# Patient Record
Sex: FEMALE | Race: WHITE | NOT HISPANIC OR LATINO | Employment: FULL TIME | URBAN - METROPOLITAN AREA
[De-identification: names, ages, dates, MRNs, and addresses within clinical notes are randomized per-mention and may not be internally consistent; named-entity substitution may affect disease eponyms.]

---

## 2018-03-02 ENCOUNTER — RECORDS - HEALTHEAST (OUTPATIENT)
Dept: LAB | Facility: CLINIC | Age: 28
End: 2018-03-02

## 2018-03-02 LAB
ALT SERPL W P-5'-P-CCNC: 10 U/L (ref 0–45)
ANTIBODY SCREEN: NEGATIVE
AST SERPL W P-5'-P-CCNC: 12 U/L (ref 0–40)
BASOPHILS # BLD AUTO: 0 THOU/UL (ref 0–0.2)
BASOPHILS NFR BLD AUTO: 0 % (ref 0–2)
CREAT SERPL-MCNC: 0.66 MG/DL (ref 0.6–1.1)
CREAT UR-MCNC: 136.8 MG/DL
EOSINOPHIL # BLD AUTO: 0.1 THOU/UL (ref 0–0.4)
EOSINOPHIL NFR BLD AUTO: 1 % (ref 0–6)
ERYTHROCYTE [DISTWIDTH] IN BLOOD BY AUTOMATED COUNT: 12.3 % (ref 11–14.5)
GFR SERPL CREATININE-BSD FRML MDRD: >60 ML/MIN/1.73M2
HCT VFR BLD AUTO: 32.9 % (ref 35–47)
HGB BLD-MCNC: 11.1 G/DL (ref 12–16)
LYMPHOCYTES # BLD AUTO: 1.9 THOU/UL (ref 0.8–4.4)
LYMPHOCYTES NFR BLD AUTO: 16 % (ref 20–40)
MCH RBC QN AUTO: 30.7 PG (ref 27–34)
MCHC RBC AUTO-ENTMCNC: 33.7 G/DL (ref 32–36)
MCV RBC AUTO: 91 FL (ref 80–100)
MONOCYTES # BLD AUTO: 0.7 THOU/UL (ref 0–0.9)
MONOCYTES NFR BLD AUTO: 6 % (ref 2–10)
NEUTROPHILS # BLD AUTO: 9.1 THOU/UL (ref 2–7.7)
NEUTROPHILS NFR BLD AUTO: 77 % (ref 50–70)
PLATELET # BLD AUTO: 229 THOU/UL (ref 140–440)
PMV BLD AUTO: 9.6 FL (ref 8.5–12.5)
PROTEIN, RANDOM URINE - HISTORICAL: 16 MG/DL
PROTEIN/CREAT RATIO, RANDOM UR: 0.12
RBC # BLD AUTO: 3.62 MILL/UL (ref 3.8–5.4)
URATE SERPL-MCNC: 3.6 MG/DL (ref 2–7.5)
WBC: 11.9 THOU/UL (ref 4–11)

## 2018-03-09 ENCOUNTER — RECORDS - HEALTHEAST (OUTPATIENT)
Dept: LAB | Facility: CLINIC | Age: 28
End: 2018-03-09

## 2018-03-09 LAB
ALT SERPL W P-5'-P-CCNC: 11 U/L (ref 0–45)
AST SERPL W P-5'-P-CCNC: 12 U/L (ref 0–40)
BASOPHILS # BLD AUTO: 0 THOU/UL (ref 0–0.2)
BASOPHILS NFR BLD AUTO: 0 % (ref 0–2)
CREAT SERPL-MCNC: 0.64 MG/DL (ref 0.6–1.1)
CREAT UR-MCNC: 99.4 MG/DL
EOSINOPHIL # BLD AUTO: 0.1 THOU/UL (ref 0–0.4)
EOSINOPHIL NFR BLD AUTO: 1 % (ref 0–6)
ERYTHROCYTE [DISTWIDTH] IN BLOOD BY AUTOMATED COUNT: 12.5 % (ref 11–14.5)
GFR SERPL CREATININE-BSD FRML MDRD: >60 ML/MIN/1.73M2
HCT VFR BLD AUTO: 32.8 % (ref 35–47)
HGB BLD-MCNC: 11 G/DL (ref 12–16)
LYMPHOCYTES # BLD AUTO: 2 THOU/UL (ref 0.8–4.4)
LYMPHOCYTES NFR BLD AUTO: 18 % (ref 20–40)
MCH RBC QN AUTO: 30.5 PG (ref 27–34)
MCHC RBC AUTO-ENTMCNC: 33.5 G/DL (ref 32–36)
MCV RBC AUTO: 91 FL (ref 80–100)
MONOCYTES # BLD AUTO: 0.6 THOU/UL (ref 0–0.9)
MONOCYTES NFR BLD AUTO: 5 % (ref 2–10)
NEUTROPHILS # BLD AUTO: 8.4 THOU/UL (ref 2–7.7)
NEUTROPHILS NFR BLD AUTO: 75 % (ref 50–70)
PLATELET # BLD AUTO: 219 THOU/UL (ref 140–440)
PMV BLD AUTO: 9.2 FL (ref 8.5–12.5)
PROTEIN, RANDOM URINE - HISTORICAL: 20 MG/DL
PROTEIN/CREAT RATIO, RANDOM UR: 0.2
RBC # BLD AUTO: 3.61 MILL/UL (ref 3.8–5.4)
URATE SERPL-MCNC: 4.3 MG/DL (ref 2–7.5)
WBC: 11.2 THOU/UL (ref 4–11)

## 2018-03-14 ENCOUNTER — COMMUNICATION - HEALTHEAST (OUTPATIENT)
Dept: ENDOCRINOLOGY | Facility: CLINIC | Age: 28
End: 2018-03-14

## 2018-03-16 ENCOUNTER — RECORDS - HEALTHEAST (OUTPATIENT)
Dept: LAB | Facility: CLINIC | Age: 28
End: 2018-03-16

## 2018-03-16 LAB
ALT SERPL W P-5'-P-CCNC: 11 U/L (ref 0–45)
AST SERPL W P-5'-P-CCNC: 11 U/L (ref 0–40)
BASOPHILS # BLD AUTO: 0 THOU/UL (ref 0–0.2)
BASOPHILS NFR BLD AUTO: 0 % (ref 0–2)
CREAT SERPL-MCNC: 0.6 MG/DL (ref 0.6–1.1)
CREAT UR-MCNC: 82.8 MG/DL
EOSINOPHIL # BLD AUTO: 0.1 THOU/UL (ref 0–0.4)
EOSINOPHIL NFR BLD AUTO: 1 % (ref 0–6)
ERYTHROCYTE [DISTWIDTH] IN BLOOD BY AUTOMATED COUNT: 12.4 % (ref 11–14.5)
GFR SERPL CREATININE-BSD FRML MDRD: >60 ML/MIN/1.73M2
HCT VFR BLD AUTO: 33.2 % (ref 35–47)
HGB BLD-MCNC: 11.1 G/DL (ref 12–16)
LYMPHOCYTES # BLD AUTO: 2.2 THOU/UL (ref 0.8–4.4)
LYMPHOCYTES NFR BLD AUTO: 17 % (ref 20–40)
MCH RBC QN AUTO: 30.6 PG (ref 27–34)
MCHC RBC AUTO-ENTMCNC: 33.4 G/DL (ref 32–36)
MCV RBC AUTO: 92 FL (ref 80–100)
MONOCYTES # BLD AUTO: 0.8 THOU/UL (ref 0–0.9)
MONOCYTES NFR BLD AUTO: 6 % (ref 2–10)
NEUTROPHILS # BLD AUTO: 9.9 THOU/UL (ref 2–7.7)
NEUTROPHILS NFR BLD AUTO: 76 % (ref 50–70)
PLATELET # BLD AUTO: 245 THOU/UL (ref 140–440)
PMV BLD AUTO: 9.3 FL (ref 8.5–12.5)
PROTEIN, RANDOM URINE - HISTORICAL: 13 MG/DL
PROTEIN/CREAT RATIO, RANDOM UR: 0.16
RBC # BLD AUTO: 3.63 MILL/UL (ref 3.8–5.4)
URATE SERPL-MCNC: 3.6 MG/DL (ref 2–7.5)
WBC: 13.1 THOU/UL (ref 4–11)

## 2018-03-21 ENCOUNTER — OFFICE VISIT - HEALTHEAST (OUTPATIENT)
Dept: EDUCATION SERVICES | Facility: CLINIC | Age: 28
End: 2018-03-21

## 2018-03-21 DIAGNOSIS — O24.419 GESTATIONAL DIABETES: ICD-10-CM

## 2018-03-23 ENCOUNTER — COMMUNICATION - HEALTHEAST (OUTPATIENT)
Dept: EDUCATION SERVICES | Facility: CLINIC | Age: 28
End: 2018-03-23

## 2018-03-23 ENCOUNTER — RECORDS - HEALTHEAST (OUTPATIENT)
Dept: LAB | Facility: CLINIC | Age: 28
End: 2018-03-23

## 2018-03-23 LAB
ALT SERPL W P-5'-P-CCNC: 10 U/L (ref 0–45)
AST SERPL W P-5'-P-CCNC: 12 U/L (ref 0–40)
BASOPHILS # BLD AUTO: 0 THOU/UL (ref 0–0.2)
BASOPHILS NFR BLD AUTO: 0 % (ref 0–2)
CREAT SERPL-MCNC: 0.63 MG/DL (ref 0.6–1.1)
CREAT UR-MCNC: 95.2 MG/DL
EOSINOPHIL # BLD AUTO: 0.2 THOU/UL (ref 0–0.4)
EOSINOPHIL NFR BLD AUTO: 1 % (ref 0–6)
ERYTHROCYTE [DISTWIDTH] IN BLOOD BY AUTOMATED COUNT: 12.7 % (ref 11–14.5)
GFR SERPL CREATININE-BSD FRML MDRD: >60 ML/MIN/1.73M2
HCT VFR BLD AUTO: 33.8 % (ref 35–47)
HGB BLD-MCNC: 11.3 G/DL (ref 12–16)
LYMPHOCYTES # BLD AUTO: 2.1 THOU/UL (ref 0.8–4.4)
LYMPHOCYTES NFR BLD AUTO: 19 % (ref 20–40)
MCH RBC QN AUTO: 30.5 PG (ref 27–34)
MCHC RBC AUTO-ENTMCNC: 33.4 G/DL (ref 32–36)
MCV RBC AUTO: 91 FL (ref 80–100)
MONOCYTES # BLD AUTO: 0.8 THOU/UL (ref 0–0.9)
MONOCYTES NFR BLD AUTO: 7 % (ref 2–10)
NEUTROPHILS # BLD AUTO: 8.4 THOU/UL (ref 2–7.7)
NEUTROPHILS NFR BLD AUTO: 73 % (ref 50–70)
PLATELET # BLD AUTO: 231 THOU/UL (ref 140–440)
PMV BLD AUTO: 9.4 FL (ref 8.5–12.5)
PROTEIN, RANDOM URINE - HISTORICAL: 16 MG/DL
PROTEIN/CREAT RATIO, RANDOM UR: 0.17
RBC # BLD AUTO: 3.71 MILL/UL (ref 3.8–5.4)
URATE SERPL-MCNC: 4.1 MG/DL (ref 2–7.5)
WBC: 11.6 THOU/UL (ref 4–11)

## 2018-03-30 ENCOUNTER — RECORDS - HEALTHEAST (OUTPATIENT)
Dept: LAB | Facility: CLINIC | Age: 28
End: 2018-03-30

## 2018-03-30 LAB
ALT SERPL W P-5'-P-CCNC: 10 U/L (ref 0–45)
AST SERPL W P-5'-P-CCNC: 11 U/L (ref 0–40)
BASOPHILS # BLD AUTO: 0 THOU/UL (ref 0–0.2)
BASOPHILS NFR BLD AUTO: 0 % (ref 0–2)
CREAT SERPL-MCNC: 0.58 MG/DL (ref 0.6–1.1)
CREAT UR-MCNC: 72.5 MG/DL
EOSINOPHIL # BLD AUTO: 0.1 THOU/UL (ref 0–0.4)
EOSINOPHIL NFR BLD AUTO: 1 % (ref 0–6)
ERYTHROCYTE [DISTWIDTH] IN BLOOD BY AUTOMATED COUNT: 12.8 % (ref 11–14.5)
GFR SERPL CREATININE-BSD FRML MDRD: >60 ML/MIN/1.73M2
HCT VFR BLD AUTO: 30.8 % (ref 35–47)
HGB BLD-MCNC: 10.4 G/DL (ref 12–16)
LYMPHOCYTES # BLD AUTO: 1.7 THOU/UL (ref 0.8–4.4)
LYMPHOCYTES NFR BLD AUTO: 16 % (ref 20–40)
MCH RBC QN AUTO: 31.1 PG (ref 27–34)
MCHC RBC AUTO-ENTMCNC: 33.8 G/DL (ref 32–36)
MCV RBC AUTO: 92 FL (ref 80–100)
MONOCYTES # BLD AUTO: 0.7 THOU/UL (ref 0–0.9)
MONOCYTES NFR BLD AUTO: 6 % (ref 2–10)
NEUTROPHILS # BLD AUTO: 8.5 THOU/UL (ref 2–7.7)
NEUTROPHILS NFR BLD AUTO: 77 % (ref 50–70)
PLATELET # BLD AUTO: 236 THOU/UL (ref 140–440)
PMV BLD AUTO: 9.6 FL (ref 8.5–12.5)
PROTEIN, RANDOM URINE - HISTORICAL: 13 MG/DL
PROTEIN/CREAT RATIO, RANDOM UR: 0.18
RBC # BLD AUTO: 3.34 MILL/UL (ref 3.8–5.4)
URATE SERPL-MCNC: 4 MG/DL (ref 2–7.5)
WBC: 11 THOU/UL (ref 4–11)

## 2018-04-03 ENCOUNTER — AMBULATORY - HEALTHEAST (OUTPATIENT)
Dept: EDUCATION SERVICES | Facility: CLINIC | Age: 28
End: 2018-04-03

## 2018-04-03 DIAGNOSIS — O24.419 GESTATIONAL DIABETES: ICD-10-CM

## 2018-04-04 ENCOUNTER — OFFICE VISIT - HEALTHEAST (OUTPATIENT)
Dept: FAMILY MEDICINE | Facility: CLINIC | Age: 28
End: 2018-04-04

## 2018-04-04 ENCOUNTER — COMMUNICATION - HEALTHEAST (OUTPATIENT)
Dept: SCHEDULING | Facility: CLINIC | Age: 28
End: 2018-04-04

## 2018-04-04 ENCOUNTER — COMMUNICATION - HEALTHEAST (OUTPATIENT)
Dept: TELEHEALTH | Facility: CLINIC | Age: 28
End: 2018-04-04

## 2018-04-04 DIAGNOSIS — Z34.90 PREGNANT: ICD-10-CM

## 2018-04-04 DIAGNOSIS — R11.2 NAUSEA & VOMITING: ICD-10-CM

## 2018-04-11 ENCOUNTER — COMMUNICATION - HEALTHEAST (OUTPATIENT)
Dept: EDUCATION SERVICES | Facility: CLINIC | Age: 28
End: 2018-04-11

## 2018-04-12 ENCOUNTER — OFFICE VISIT - HEALTHEAST (OUTPATIENT)
Dept: ENDOCRINOLOGY | Facility: CLINIC | Age: 28
End: 2018-04-12

## 2018-04-12 ENCOUNTER — OFFICE VISIT - HEALTHEAST (OUTPATIENT)
Dept: EDUCATION SERVICES | Facility: CLINIC | Age: 28
End: 2018-04-12

## 2018-04-12 DIAGNOSIS — O24.414 INSULIN CONTROLLED GESTATIONAL DIABETES MELLITUS (GDM) IN THIRD TRIMESTER: ICD-10-CM

## 2018-04-12 RX ORDER — SWAB
1 SWAB, NON-MEDICATED MISCELLANEOUS DAILY
Status: SHIPPED | COMMUNITY
Start: 2018-04-12

## 2018-04-17 ENCOUNTER — RECORDS - HEALTHEAST (OUTPATIENT)
Dept: LAB | Facility: CLINIC | Age: 28
End: 2018-04-17

## 2018-04-17 LAB
ALT SERPL W P-5'-P-CCNC: 10 U/L (ref 0–45)
AST SERPL W P-5'-P-CCNC: 12 U/L (ref 0–40)
BASOPHILS # BLD AUTO: 0 THOU/UL (ref 0–0.2)
BASOPHILS NFR BLD AUTO: 0 % (ref 0–2)
CREAT SERPL-MCNC: 0.62 MG/DL (ref 0.6–1.1)
CREAT UR-MCNC: 83.7 MG/DL
EOSINOPHIL # BLD AUTO: 0.1 THOU/UL (ref 0–0.4)
EOSINOPHIL NFR BLD AUTO: 1 % (ref 0–6)
ERYTHROCYTE [DISTWIDTH] IN BLOOD BY AUTOMATED COUNT: 12.8 % (ref 11–14.5)
GFR SERPL CREATININE-BSD FRML MDRD: >60 ML/MIN/1.73M2
HCT VFR BLD AUTO: 31.5 % (ref 35–47)
HGB BLD-MCNC: 10.7 G/DL (ref 12–16)
LYMPHOCYTES # BLD AUTO: 2.3 THOU/UL (ref 0.8–4.4)
LYMPHOCYTES NFR BLD AUTO: 18 % (ref 20–40)
MCH RBC QN AUTO: 30.7 PG (ref 27–34)
MCHC RBC AUTO-ENTMCNC: 34 G/DL (ref 32–36)
MCV RBC AUTO: 91 FL (ref 80–100)
MONOCYTES # BLD AUTO: 0.9 THOU/UL (ref 0–0.9)
MONOCYTES NFR BLD AUTO: 7 % (ref 2–10)
NEUTROPHILS # BLD AUTO: 9.5 THOU/UL (ref 2–7.7)
NEUTROPHILS NFR BLD AUTO: 74 % (ref 50–70)
PLATELET # BLD AUTO: 241 THOU/UL (ref 140–440)
PMV BLD AUTO: 9.4 FL (ref 8.5–12.5)
PROTEIN, RANDOM URINE - HISTORICAL: 17 MG/DL
PROTEIN/CREAT RATIO, RANDOM UR: 0.2
RBC # BLD AUTO: 3.48 MILL/UL (ref 3.8–5.4)
URATE SERPL-MCNC: 3.8 MG/DL (ref 2–7.5)
WBC: 12.9 THOU/UL (ref 4–11)

## 2018-04-24 ENCOUNTER — HOSPITAL ENCOUNTER (OUTPATIENT)
Dept: MEDSURG UNIT | Facility: CLINIC | Age: 28
Discharge: HOME | End: 2018-04-24
Attending: OBSTETRICS & GYNECOLOGY | Admitting: OBSTETRICS & GYNECOLOGY

## 2018-04-24 LAB
ABO/RH(D): NORMAL
ALBUMIN UR-MCNC: NEGATIVE MG/DL
ALT SERPL W P-5'-P-CCNC: 10 U/L (ref 0–45)
ANTIBODY SCREEN: NORMAL
APTT PPP: 27 SECONDS (ref 24–37)
AST SERPL W P-5'-P-CCNC: 10 U/L (ref 0–40)
CREAT SERPL-MCNC: 0.65 MG/DL (ref 0.6–1.1)
CREAT UR-MCNC: 47.8 MG/DL
ERYTHROCYTE [DISTWIDTH] IN BLOOD BY AUTOMATED COUNT: 13 % (ref 11–14.5)
GFR SERPL CREATININE-BSD FRML MDRD: >60 ML/MIN/1.73M2
GLUCOSE UR STRIP-MCNC: NEGATIVE MG/DL
HCT VFR BLD AUTO: 30.4 % (ref 35–47)
HGB BLD-MCNC: 10.2 G/DL (ref 12–16)
INR PPP: 1.04 (ref 0.9–1.1)
KETONES UR STRIP-MCNC: NEGATIVE MG/DL
MCH RBC QN AUTO: 30.4 PG (ref 27–34)
MCHC RBC AUTO-ENTMCNC: 33.6 G/DL (ref 32–36)
MCV RBC AUTO: 91 FL (ref 80–100)
PLATELET # BLD AUTO: 201 THOU/UL (ref 140–440)
PMV BLD AUTO: 9.3 FL (ref 8.5–12.5)
PROTEIN, RANDOM URINE - HISTORICAL: 12 MG/DL
PROTEIN/CREAT RATIO, RANDOM UR: 0.25
RBC # BLD AUTO: 3.35 MILL/UL (ref 3.8–5.4)
URATE SERPL-MCNC: 3.9 MG/DL (ref 2–7.5)
WBC: 10.6 THOU/UL (ref 4–11)

## 2018-04-24 ASSESSMENT — MIFFLIN-ST. JEOR: SCORE: 1686.11

## 2018-04-26 ENCOUNTER — RECORDS - HEALTHEAST (OUTPATIENT)
Dept: LAB | Facility: CLINIC | Age: 28
End: 2018-04-26

## 2018-04-26 ENCOUNTER — OFFICE VISIT - HEALTHEAST (OUTPATIENT)
Dept: EDUCATION SERVICES | Facility: CLINIC | Age: 28
End: 2018-04-26

## 2018-04-26 ENCOUNTER — OFFICE VISIT - HEALTHEAST (OUTPATIENT)
Dept: ENDOCRINOLOGY | Facility: CLINIC | Age: 28
End: 2018-04-26

## 2018-04-26 DIAGNOSIS — O24.414 INSULIN CONTROLLED GESTATIONAL DIABETES MELLITUS (GDM) IN THIRD TRIMESTER: ICD-10-CM

## 2018-04-26 LAB
CREAT UR-MCNC: 63.9 MG/DL
PROTEIN, RANDOM URINE - HISTORICAL: 16 MG/DL
PROTEIN/CREAT RATIO, RANDOM UR: 0.25

## 2018-04-26 ASSESSMENT — MIFFLIN-ST. JEOR: SCORE: 1698.36

## 2018-05-04 ENCOUNTER — RECORDS - HEALTHEAST (OUTPATIENT)
Dept: LAB | Facility: CLINIC | Age: 28
End: 2018-05-04

## 2018-05-04 LAB
CREAT UR-MCNC: 14.9 MG/DL
PROTEIN, RANDOM URINE - HISTORICAL: <7 MG/DL
PROTEIN/CREAT RATIO, RANDOM UR: NORMAL

## 2018-05-10 ENCOUNTER — RECORDS - HEALTHEAST (OUTPATIENT)
Dept: ADMINISTRATIVE | Facility: OTHER | Age: 28
End: 2018-05-10

## 2018-05-16 ENCOUNTER — ANESTHESIA - HEALTHEAST (OUTPATIENT)
Dept: OBGYN | Facility: CLINIC | Age: 28
End: 2018-05-16

## 2018-05-18 ENCOUNTER — HOME CARE/HOSPICE - HEALTHEAST (OUTPATIENT)
Dept: HOME HEALTH SERVICES | Facility: HOME HEALTH | Age: 28
End: 2018-05-18

## 2018-05-19 ENCOUNTER — HOME CARE/HOSPICE - HEALTHEAST (OUTPATIENT)
Dept: HOME HEALTH SERVICES | Facility: HOME HEALTH | Age: 28
End: 2018-05-19

## 2018-05-21 ENCOUNTER — RECORDS - HEALTHEAST (OUTPATIENT)
Dept: LAB | Facility: CLINIC | Age: 28
End: 2018-05-21

## 2018-05-21 LAB
BASOPHILS # BLD AUTO: 0.1 THOU/UL (ref 0–0.2)
BASOPHILS NFR BLD AUTO: 0 % (ref 0–2)
EOSINOPHIL # BLD AUTO: 0.4 THOU/UL (ref 0–0.4)
EOSINOPHIL NFR BLD AUTO: 3 % (ref 0–6)
ERYTHROCYTE [DISTWIDTH] IN BLOOD BY AUTOMATED COUNT: 12.5 % (ref 11–14.5)
HCT VFR BLD AUTO: 26.1 % (ref 35–47)
HGB BLD-MCNC: 8.5 G/DL (ref 12–16)
LYMPHOCYTES # BLD AUTO: 2.4 THOU/UL (ref 0.8–4.4)
LYMPHOCYTES NFR BLD AUTO: 16 % (ref 20–40)
MCH RBC QN AUTO: 29.9 PG (ref 27–34)
MCHC RBC AUTO-ENTMCNC: 32.6 G/DL (ref 32–36)
MCV RBC AUTO: 92 FL (ref 80–100)
MONOCYTES # BLD AUTO: 0.9 THOU/UL (ref 0–0.9)
MONOCYTES NFR BLD AUTO: 6 % (ref 2–10)
NEUTROPHILS # BLD AUTO: 10.9 THOU/UL (ref 2–7.7)
NEUTROPHILS NFR BLD AUTO: 75 % (ref 50–70)
PLATELET # BLD AUTO: 321 THOU/UL (ref 140–440)
PMV BLD AUTO: 9.3 FL (ref 8.5–12.5)
RBC # BLD AUTO: 2.84 MILL/UL (ref 3.8–5.4)
WBC: 14.7 THOU/UL (ref 4–11)

## 2018-05-23 ENCOUNTER — COMMUNICATION - HEALTHEAST (OUTPATIENT)
Dept: TELEHEALTH | Facility: CLINIC | Age: 28
End: 2018-05-23

## 2018-05-23 ENCOUNTER — COMMUNICATION - HEALTHEAST (OUTPATIENT)
Dept: HEALTH INFORMATION MANAGEMENT | Facility: CLINIC | Age: 28
End: 2018-05-23

## 2018-05-29 ENCOUNTER — COMMUNICATION - HEALTHEAST (OUTPATIENT)
Dept: OBGYN | Facility: HOSPITAL | Age: 28
End: 2018-05-29

## 2020-07-26 ENCOUNTER — HOSPITAL ENCOUNTER (OUTPATIENT)
Dept: MEDSURG UNIT | Facility: CLINIC | Age: 30
Discharge: HOME | End: 2020-07-27
Attending: OBSTETRICS & GYNECOLOGY | Admitting: OBSTETRICS & GYNECOLOGY

## 2020-07-26 LAB
ABO/RH(D): NORMAL
COMPONENT (HISTORICAL CONVERSION): NORMAL
FBS KIT EXPIRATION DATE: NORMAL
FBS KIT LOT #: NORMAL
FETAL BLEED SCREEN: NORMAL
LOT NUMBER (HISTORICAL CONVERSION): NORMAL
STATUS (HISTORICAL CONVERSION): NORMAL

## 2020-07-26 ASSESSMENT — MIFFLIN-ST. JEOR: SCORE: 1640.75

## 2020-08-20 ENCOUNTER — RECORDS - HEALTHEAST (OUTPATIENT)
Dept: ADMINISTRATIVE | Facility: OTHER | Age: 30
End: 2020-08-20

## 2020-08-24 ENCOUNTER — COMMUNICATION - HEALTHEAST (OUTPATIENT)
Dept: ADMINISTRATIVE | Facility: CLINIC | Age: 30
End: 2020-08-24

## 2020-08-26 ENCOUNTER — AMBULATORY - HEALTHEAST (OUTPATIENT)
Dept: EDUCATION SERVICES | Facility: CLINIC | Age: 30
End: 2020-08-26

## 2020-08-26 ENCOUNTER — COMMUNICATION - HEALTHEAST (OUTPATIENT)
Dept: EDUCATION SERVICES | Facility: CLINIC | Age: 30
End: 2020-08-26

## 2020-08-26 DIAGNOSIS — O24.419 GDM (GESTATIONAL DIABETES MELLITUS): ICD-10-CM

## 2020-09-08 ENCOUNTER — AMBULATORY - HEALTHEAST (OUTPATIENT)
Dept: EDUCATION SERVICES | Facility: CLINIC | Age: 30
End: 2020-09-08

## 2020-09-08 DIAGNOSIS — O24.414 INSULIN CONTROLLED GESTATIONAL DIABETES MELLITUS (GDM) IN THIRD TRIMESTER: ICD-10-CM

## 2020-09-09 ENCOUNTER — COMMUNICATION - HEALTHEAST (OUTPATIENT)
Dept: ADMINISTRATIVE | Facility: CLINIC | Age: 30
End: 2020-09-09

## 2020-09-09 ENCOUNTER — COMMUNICATION - HEALTHEAST (OUTPATIENT)
Dept: EDUCATION SERVICES | Facility: CLINIC | Age: 30
End: 2020-09-09

## 2020-09-09 ENCOUNTER — AMBULATORY - HEALTHEAST (OUTPATIENT)
Dept: ENDOCRINOLOGY | Facility: CLINIC | Age: 30
End: 2020-09-09

## 2020-09-09 DIAGNOSIS — O24.419 GESTATIONAL DIABETES MELLITUS (GDM), ANTEPARTUM, GESTATIONAL DIABETES METHOD OF CONTROL UNSPECIFIED: ICD-10-CM

## 2020-09-09 DIAGNOSIS — O24.414 INSULIN CONTROLLED GESTATIONAL DIABETES MELLITUS (GDM) IN THIRD TRIMESTER: ICD-10-CM

## 2020-09-10 ENCOUNTER — AMBULATORY - HEALTHEAST (OUTPATIENT)
Dept: EDUCATION SERVICES | Facility: CLINIC | Age: 30
End: 2020-09-10

## 2020-09-10 DIAGNOSIS — O24.419 GESTATIONAL DIABETES MELLITUS (GDM), ANTEPARTUM, GESTATIONAL DIABETES METHOD OF CONTROL UNSPECIFIED: ICD-10-CM

## 2020-09-16 ENCOUNTER — COMMUNICATION - HEALTHEAST (OUTPATIENT)
Dept: ENDOCRINOLOGY | Facility: CLINIC | Age: 30
End: 2020-09-16

## 2020-09-17 ENCOUNTER — OFFICE VISIT - HEALTHEAST (OUTPATIENT)
Dept: ENDOCRINOLOGY | Facility: CLINIC | Age: 30
End: 2020-09-17

## 2020-09-17 DIAGNOSIS — O24.414 INSULIN CONTROLLED GESTATIONAL DIABETES MELLITUS (GDM) IN THIRD TRIMESTER: ICD-10-CM

## 2020-10-07 ENCOUNTER — OFFICE VISIT - HEALTHEAST (OUTPATIENT)
Dept: ENDOCRINOLOGY | Facility: CLINIC | Age: 30
End: 2020-10-07

## 2020-10-07 DIAGNOSIS — O24.414 INSULIN CONTROLLED GESTATIONAL DIABETES MELLITUS (GDM) IN THIRD TRIMESTER: ICD-10-CM

## 2020-10-12 ENCOUNTER — AMBULATORY - HEALTHEAST (OUTPATIENT)
Dept: OBGYN | Facility: CLINIC | Age: 30
End: 2020-10-12

## 2020-10-12 DIAGNOSIS — Z33.1 PREGNANT STATE, INCIDENTAL: ICD-10-CM

## 2020-10-13 ENCOUNTER — AMBULATORY - HEALTHEAST (OUTPATIENT)
Dept: LAB | Facility: CLINIC | Age: 30
End: 2020-10-13

## 2020-10-13 ENCOUNTER — RECORDS - HEALTHEAST (OUTPATIENT)
Dept: ADMINISTRATIVE | Facility: OTHER | Age: 30
End: 2020-10-13

## 2020-10-13 DIAGNOSIS — Z33.1 PREGNANT STATE, INCIDENTAL: ICD-10-CM

## 2020-10-15 ENCOUNTER — COMMUNICATION - HEALTHEAST (OUTPATIENT)
Dept: SCHEDULING | Facility: CLINIC | Age: 30
End: 2020-10-15

## 2020-10-15 ENCOUNTER — HOSPITAL ENCOUNTER (OUTPATIENT)
Dept: MEDSURG UNIT | Facility: CLINIC | Age: 30
Discharge: HOME | End: 2020-10-15
Attending: OBSTETRICS & GYNECOLOGY | Admitting: OBSTETRICS & GYNECOLOGY

## 2020-10-26 ENCOUNTER — AMBULATORY - HEALTHEAST (OUTPATIENT)
Dept: OBGYN | Facility: CLINIC | Age: 30
End: 2020-10-26

## 2020-10-26 DIAGNOSIS — Z33.1 PREGNANT STATE, INCIDENTAL: ICD-10-CM

## 2020-10-27 ENCOUNTER — AMBULATORY - HEALTHEAST (OUTPATIENT)
Dept: LAB | Facility: CLINIC | Age: 30
End: 2020-10-27

## 2020-10-27 DIAGNOSIS — Z33.1 PREGNANT STATE, INCIDENTAL: ICD-10-CM

## 2020-10-28 ENCOUNTER — ANESTHESIA - HEALTHEAST (OUTPATIENT)
Dept: OBGYN | Facility: CLINIC | Age: 30
End: 2020-10-28

## 2020-10-28 ENCOUNTER — AMBULATORY - HEALTHEAST (OUTPATIENT)
Dept: LAB | Facility: CLINIC | Age: 30
End: 2020-10-28

## 2020-10-28 DIAGNOSIS — O24.414 INSULIN CONTROLLED GESTATIONAL DIABETES MELLITUS (GDM) IN THIRD TRIMESTER: ICD-10-CM

## 2020-10-28 LAB
ABO/RH(D): NORMAL
ANTIBODY SCREEN: ABNORMAL
SARS-COV-2 PCR COMMENT: NORMAL
SARS-COV-2 RNA SPEC QL NAA+PROBE: NEGATIVE
SARS-COV-2 VIRUS SPECIMEN SOURCE: NORMAL

## 2020-10-29 ENCOUNTER — COMMUNICATION - HEALTHEAST (OUTPATIENT)
Dept: SCHEDULING | Facility: CLINIC | Age: 30
End: 2020-10-29

## 2020-10-29 ENCOUNTER — HOME CARE/HOSPICE - HEALTHEAST (OUTPATIENT)
Dept: HOME HEALTH SERVICES | Facility: HOME HEALTH | Age: 30
End: 2020-10-29

## 2020-10-29 ENCOUNTER — SURGERY - HEALTHEAST (OUTPATIENT)
Dept: OBGYN | Facility: CLINIC | Age: 30
End: 2020-10-29

## 2020-10-29 ENCOUNTER — HOSPITAL ENCOUNTER (OUTPATIENT)
Dept: OBGYN | Facility: CLINIC | Age: 30
Discharge: HOME | End: 2020-10-29

## 2020-10-29 LAB — ANTIBODY ID: NORMAL

## 2020-10-29 ASSESSMENT — MIFFLIN-ST. JEOR: SCORE: 1686.11

## 2020-11-01 ENCOUNTER — HOME CARE/HOSPICE - HEALTHEAST (OUTPATIENT)
Dept: HOME HEALTH SERVICES | Facility: HOME HEALTH | Age: 30
End: 2020-11-01

## 2021-03-24 ENCOUNTER — COMMUNICATION - HEALTHEAST (OUTPATIENT)
Dept: UROLOGY | Facility: CLINIC | Age: 31
End: 2021-03-24

## 2021-03-24 ENCOUNTER — OFFICE VISIT - HEALTHEAST (OUTPATIENT)
Dept: UROLOGY | Facility: CLINIC | Age: 31
End: 2021-03-24

## 2021-03-24 DIAGNOSIS — N20.0 CALCULUS OF KIDNEY: ICD-10-CM

## 2021-03-24 DIAGNOSIS — N13.2 HYDRONEPHROSIS WITH URINARY OBSTRUCTION DUE TO URETERAL CALCULUS: ICD-10-CM

## 2021-03-24 DIAGNOSIS — N20.1 CALCULUS OF URETER: ICD-10-CM

## 2021-05-26 VITALS
HEART RATE: 70 BPM | TEMPERATURE: 97.8 F | DIASTOLIC BLOOD PRESSURE: 84 MMHG | RESPIRATION RATE: 18 BRPM | SYSTOLIC BLOOD PRESSURE: 120 MMHG

## 2021-06-01 VITALS — WEIGHT: 200.9 LBS

## 2021-06-01 VITALS — WEIGHT: 210.7 LBS | HEIGHT: 67 IN | BODY MASS INDEX: 33.07 KG/M2

## 2021-06-01 VITALS — BODY MASS INDEX: 32.65 KG/M2 | HEIGHT: 67 IN | WEIGHT: 208 LBS

## 2021-06-01 VITALS — WEIGHT: 208 LBS

## 2021-06-01 VITALS — WEIGHT: 207.6 LBS

## 2021-06-01 VITALS — WEIGHT: 209 LBS

## 2021-06-04 VITALS — HEIGHT: 67 IN | BODY MASS INDEX: 31.08 KG/M2 | WEIGHT: 198 LBS

## 2021-06-05 VITALS — HEIGHT: 67 IN | BODY MASS INDEX: 32.65 KG/M2 | WEIGHT: 208 LBS

## 2021-06-10 NOTE — PROGRESS NOTES
Pt arrived to triage via wheelchair with c/o vaginal spotting starting at 1800.  Denies gush of fluid, pain or burning with urination or contractions.  States baby is active. Urine sample obtained and will run if ordered. Unable to trace FHR on monitor, will doptone. Hedley applied.

## 2021-06-10 NOTE — TELEPHONE ENCOUNTER
External - [facility]  Referring Provider: Dr. Roman  DX: Gestational Diabetes     Ref./rec. Were received in DM consult folder on 08.20.2020 @ 12:38

## 2021-06-10 NOTE — PROGRESS NOTES
Discharge Note:  Rhogam given.   No contractions noted or felt by patient.  FHR doptoned-140's.  No further vaginal bleeding noted. Discharge instructions reviewed.  All questions answered. Continue to be seen at regularly scheduled prenatal appointments.  Pt to call with further bleeding.   Pt discharged to home from triage, ambulatory to ED entrance.

## 2021-06-10 NOTE — TELEPHONE ENCOUNTER
Please call pt to schedule a GDM follow - up appointment in 2 weeks.     Thanks!   Rosemary Olsen RDN, YAMILE  Diabetes Care and Education

## 2021-06-10 NOTE — PATIENT INSTRUCTIONS - HE
Goals for Diabetes Care:     1. Eat balanced meals (3 meals + 3 snacks daily)     Breakfast: 30 grams carbohydrate + Protein  Snack: 30 grams carbohydrate + protein  Lunch: 60 grams carbohydrate + protein/vegetables  Snack: 30 grams Carbohydrate + protein  Dinner: 60 grams carbohydrate + protein/vegetables  Bedtime Snack: 30 grams + protein     ----Make sure you include protein source with each meal and at bedtime - this has been shown to help with blood glucose elevations     2. Each Morning Check Ketones (small/mod/high - call Diabetes Care Line)  Your goal is negative or trace ketones. If you have ketones in your urine it means you are not eating enough before you go to bed. Eat a larger bedtime snack and include protein.      3. Aim to get at least 30 minutes of activity each day. Activity really helps improve blood sugars.      4. Blood Glucose Targets:              1. Fasting Less than 95 mg/dL              2. 1 hours after a meal target is less than 140 mg/dL  ----Always remember to bring meter and log book to all appointments.        Follow up with your Diabetic Educator as needed to assess BG targets in 2-3 weeks.  If Blood glucose levels are above normal 3 times or more in one week and you cannot explain them or if you develop small, moderate or high ketones call [facility] at [phone]     Call with any questions.     Thank you,  Rosemary Olsen RDN, LD  Diabetes Care and Education  [phone]

## 2021-06-11 NOTE — TELEPHONE ENCOUNTER
Patient called.   The Pharmacy just informed her that they do not have the insulin in stock. They are waiting for the order to arrive. She is wondering if the prescription can be called in to another [facility], or can she wait until the order arrives. She is not sure how long that will be.    Please try [facility] on [address].     Donya @ [phone]

## 2021-06-11 NOTE — PROGRESS NOTES
Diabetes Educator has received verbal consent for a telephone visit for the patient.  Declined video visit    IGOR GDM Care Plan    Assessment:   F/u GDM visit. Donya has been testing BS 4x/d and daily ketones. All her FBS have been high. She has also had a couple of high prandial BS (which are explainable) States, she has been watching CHO foods and adds protein to all meals. She is back at work now (is a ) and activity/walking after meals is challenging at times. She tries to stay as active as she possibly can. Ketones are negative/trace.  Current wt: 90 kg (pt reported)     Does 3 meals/d typically  Food Recall:  BF: protein shake, apple peanut butter  L: whole wheat PBJ (reduced sugar), veggies  D: brown rice, steak, veggies   HS snack: protein shake with 15 g of protein       SMBG  Tests 4x/d  FBS - 97, 95, 100, 99, 93, 110, 101, 97, 97, 95, 99  Post meal BS range 97 - 131  She did have 2 elevated readings in the past 10 days: 149 (food related), 181 (a large subway sandwich)     Plan:   Per protocol, recommend starting pt on bedtime NPH insulin. Would recommend 0.15 U /kg or 14 U since most of her readings are borderline. She will be now following up at the pregnancy clinic.  Pt was on insulin during her previous pregnancy also and feels comfortable with insulin/injection technique. We reviewed insulin basics and sx and tx of hypoglycemia - pt expressed understanding.   All additional questions and concerns addressed today.    Patient Instructions:  1. Continue to test blood sugar 4 times per day and daily ketones.  2. Keep a BS and food log for review.  3. Continue to drink a lot of water to stay hydrated and dilute ketones.  4. Eat 3 meals + 3 snacks as able; include adequate amounts of CHO and protein  5. Eat a bedtime snack (with protein + CHO)  6. Stay active after meals as able and safe to help manage blood sugars.  7. Call with 3 high BS in any one area/higher than trace ketones  in a 7 -Day period.  8. F/u in 1 week at the pregnancy clinic.  9.Take insulin as prescribed       Provider: Marylou Roman  Provider's Diagnosis (per referral form): Gestational (648.83)    Weight:    OGTT: No results found for this or any previous visit.  EDC: Estimated Date of Delivery: 11/4/20   Pregnancy #: 2  Previous GDM: Yes  Medications:   Current Outpatient Medications:      acetone, urine, test (ACETONE, URINE, TEST) Strp, Test ketones once a day, Disp: 100 strip, Rfl: 2     aspirin 81 mg chewable tablet, Chew 81 mg daily., Disp: , Rfl:      blood glucose test (ACCU-CHEK GUIDE TEST STRIPS) strips, Test 4 times daily, Disp: 150 strip, Rfl: 2     blood-glucose meter Misc, Please dispense Accu Check Guide meter +compatible  testing supplies OR any meter + compatible testing supplies per insurance formualry, Disp: 1 each, Rfl: 0     cholecalciferol, vitamin D3, 1,000 unit tablet, Take 1,000 Units by mouth daily., Disp: , Rfl:      folic acid (FOLVITE) 1 MG tablet, Take 1 mg by mouth daily. Take 3 tabs daily, Disp: , Rfl:      generic lancets, Test 4 times daily, Disp: 150 each, Rfl: 2     prenatal vitamin iron-folic acid 27mg-0.8mg (PRENATAL S) 27 mg iron- 800 mcg Tab tablet, Take 1 tablet by mouth daily., Disp: , Rfl:   PNV: yes  BG monitoring goals: Fasting <95; 1 hour post start of meal <140. Test 4 x per day.  Check fasting a.m. ketones: Yes  GDM meal pattern/carb counting taught per guidelines: Yes  Goals: Nutrition: GDM meal plan  Activity:  Walking after meals when able/staying active  Monitoring:  BG 4x/day as directed, ketones every morning    F/u in 1 week to assess BG and food log     DIABETES CARE PLAN AND EDUCATION RECORD    Gestational Diabetes Disease Process/Preconception Care/Management During Pregnancy/Postpartum:    Meter (per above goals): Discussed, Literature provided and Patient returned demonstration    Nutrition Management    Weight: Assessed, Discussed and Literature  provided  Portions/Balance: Assessed, Discussed and Literature provided  Carb ID/Count: Assessed, Discussed and Literature provided  Label Reading: Assessed, Discussed and Literature provided  Menu Planning: Assessed, Discussed and Literature provided  Dining Out: Assessed, Discussed and Literature provided  Physical Activity: Discussed and Literature provided    Acute Complications: Prevent, Detect, Treat:    Goal Setting and Problem Solving: Assessed and Discussed  Barriers: Assessed and Discussed  Psychosocial Adjustments: Assessed and Discussed      Time: 45 minutes  Visit Type: GDM   Visit #: 2    I agree with the aforementioned diabetes plan.  Vidya ROLON [facility]  9/9/2020  4:25 PM

## 2021-06-11 NOTE — PATIENT INSTRUCTIONS - HE
1. Continue to test blood sugar 4 times per day and daily ketones.  2. Keep a BS and food log for review.  3. Continue to drink a lot of water to stay hydrated and dilute ketones.  4. Eat 3 meals + 3 snacks as able; include adequate amounts of CHO and protein  5. Eat a bedtime snack (with protein + CHO)  6. Stay active after meals as able and safe to help manage blood sugars.  7. Call with 3 high BS in any one area/higher than trace ketones in a 7 -Day period.  8. F/u in 1 week at the pregnancy clinic.  9. Take insulin as prescribed

## 2021-06-11 NOTE — TELEPHONE ENCOUNTER
Patient was informed a night time insulin rx would be sent to the pharmacy this morning, but the pharmacy does not have the prescription. Please send rx       Juan on file    Donya @ [phone]

## 2021-06-11 NOTE — PROGRESS NOTES
"Donya Bliss is a 30 y.o. female who is being evaluated via a billable video visit.      The patient has been notified of following:     \"This video visit will be conducted via a call between you and your physician/provider. We have found that certain health care needs can be provided without the need for an in-person physical exam.  This service lets us provide the care you need with a video conversation.  If a prescription is necessary we can send it directly to your pharmacy.  If lab work is needed we can place an order for that and you can then stop by our lab to have the test done at a later time.    Video visits are billed at different rates depending on your insurance coverage. Please reach out to your insurance provider with any questions.    If during the course of the call the physician/provider feels a video visit is not appropriate, you will not be charged for this service.\"    Patient has given verbal consent to a Video visit? Yes  How would you like to obtain your AVS? AVS Preference: Sellbox[employer].  If dropped by the video visit, the video invitation should be sent to: Other e-mail: My Chart  Will anyone else be joining your video visit? No        Video Start Time: 1418    Additional provider notes:      Reason for visit      1. Insulin controlled gestational diabetes mellitus (GDM) in third trimester        HPI     Donya Bliss is a very pleasant 30 y.o. old female who presents for GESTATIONAL Diabetes Mellitus.  She is currently 33w1d  pregnant . Due date is 2020   Diagnosed with GDM based on an OGTT. She hashad  GDM in prior pregnancies.   Current carbohydrate intake:consistent with recommendations of 30g-60g-60g.  I have reviewed her blood glucose logs and note that the:  Fasting readings  are:in range on current regimen  Postprandial readings are:in range on current regimen  Current Levemir dose:8 u  Current Prandial insulin:   Blood glucose logs/meter brought in and data reviewed and " incorporated into decision-making.  Planned delivery at: [facility]  OBGYN: [facility]    Therapy/Interventions in the past:  She has been seen by the Diabetes Educator- and has received instruction on carbohydrate counting and  consistency.  Records from referring provider and other sources have also been reviewed and incorporated into decision-making.      TODAY:    Donya is contacted today via Video visit for GDM. We are joined by KHUSHBU Rodríguez.  She has had it in a previous pregnancy and baby weighed 8 lb, 9 oz. She provides BG for us today and they all look good - she started insulin last Friday night, and her FBS improved immediately. No questions today. She reports that baby is moving appropriately and she is having no swelling at present.     Blood Sugar Readings    9-10 Thursday  F 106  B 111  L 140  D 129    9-11 Friday  F 106  B <140  L <140  D 141    9-12 Saturday  F 89  B 110  L 129  D <140    9-13 Sunday   F 95  B <140  L <140  D <140    9-14 Monday  F 94  B <140  L <140  D <140    9-15 Tuesday  F 87  B <140  L <140  D <140    9-16 Wednesday   F 88  B <140  L <140  D 141    9-17 Thursday  F 87  B 131  L <140      Past Medical History       Patient Active Problem List   Diagnosis     Insulin controlled gestational diabetes mellitus (GDM) in third trimester     Gestational hypertension     Pregnant     Delivery normal        Past Surgical History     Past Surgical History:   Procedure Laterality Date     REDUCTION MAMMAPLASTY  2009     WISDOM TOOTH EXTRACTION Bilateral 2008       Family History     No family history on file.    Social History     Social History     Tobacco Use     Smoking status: Never Smoker     Smokeless tobacco: Never Used   Substance Use Topics     Alcohol use: No     Drug use: No       Review of Systems     Patient has no polyuria or polydipsia, no chest pain, dyspnea or TIA's, no numbness, tingling or pain in extremities  Remainder negative except as noted in HPI.      Vital Signs      There were no vitals taken for this visit.  Wt Readings from Last 3 Encounters:   20 198 lb (89.8 kg)   18 210 lb (95.3 kg)   05/10/18 210 lb (95.3 kg)           Assessment     1. Insulin controlled gestational diabetes mellitus (GDM) in third trimester        Plan     1. GESTATIONAL DIABETES-  Adjust dose as follows:    -Levemir insulin8   units. Increase by 2 units every 2 days to keep fasting blood glucose below 95mg/dL  -Novolog 0  units with breakfast  -Novolog 0 units with lunch   -Novolog 0 units with dinner  -Increase by 0 units every 2 days to keep 1 hour after meal blood glucose less than 140mg/dL    We reviewed glucose goals of fasting blood glucose <95 mg/dL and 1 hour post prandial blood glucose of <140 mg/dL.    Monitor blood sugar 4 times daily: Fasting  and 1 hour after each meal.  Contact  this clinic [phone] if blood glucose is not within the above-mentioned goals.     We discussed the importance of excellent glycemic control during pregnancy to limit complications such as fetal macrosomia, shoulder dystocia,  hypoglycemia and hyperbilirubinemia.  I have discussed the patient's increased risk of recurrent GDM and/or development of type 2 diabetes later in life.      F/u in 2 weeks            Lab Results     Creatinine   Date Value Ref Range Status   05/10/2018 0.65 0.60 - 1.10 mg/dL Final   2018 0.65 0.60 - 1.10 mg/dL Final   2018 0.62 0.60 - 1.10 mg/dL Final       No results found for: CHOL, HDL, LDLCALC, TRIG    Lab Results   Component Value Date    ALT <9 05/10/2018    AST 12 05/10/2018         Current Medications     Outpatient Medications Prior to Visit   Medication Sig Dispense Refill     acetone, urine, test (ACETONE, URINE, TEST) Strp Test ketones once a day 100 strip 2     aspirin 81 mg chewable tablet Chew 81 mg daily.       blood glucose test (ACCU-CHEK GUIDE TEST STRIPS) strips Test 4 times daily 150 strip 2     blood-glucose meter Misc Please  "dispense Accu Check Guide meter +compatible  testing supplies OR any meter + compatible testing supplies per insurance formualry 1 each 0     cholecalciferol, vitamin D3, 1,000 unit tablet Take 1,000 Units by mouth daily.       ferrous sulfate (IRON ORAL) Take by mouth.       folic acid (FOLVITE) 1 MG tablet Take 1 mg by mouth daily. Take 3 tabs daily       generic lancets Test 4 times daily 150 each 2     HUMULIN N NPH INSULIN KWIKPEN 100 unit/mL (3 mL) pen Start taking 8 units every night.  Increase as directed.  Max daily dose 30 units. 6 mL 2     insulin detemir U-100 (LEVEMIR FLEXTOUCH U-100 INSULN) 100 unit/mL (3 mL) pen Start taking 8 units every night at bedtime.  Increase as directed.  Max daily dose 30 units. 6 mL 2     pen needle, diabetic (BD ULTRA-FINE HOMER PEN NEEDLE) 32 gauge x 5/32\" Ndle Use daily with insulin pen 100 each 5     prenatal vitamin iron-folic acid 27mg-0.8mg (PRENATAL S) 27 mg iron- 800 mcg Tab tablet Take 1 tablet by mouth daily.       No facility-administered medications prior to visit.            Video-Visit Details    Type of service:  Video Visit    Video End Time (time video stopped): 1439  Originating Location (pt. Location): Home    Distant Location (provider location):  [facility] ENDOCRINOLOGY     Platform used for Video Visit: Willy SALDAÑA      " Never

## 2021-06-11 NOTE — TELEPHONE ENCOUNTER
Central PA team  [phone]  Pool: HE PA MED ([id number])          PA has been initiated.       PA form completed and faxed insurance via Cover My Meds     Key:  [id number]     Medication:  LEVEMIR FLEXTOUCH    Insurance:  HEALTH [employer]        Response will be received via fax and may take up to 5-10 business days depending on plan

## 2021-06-11 NOTE — TELEPHONE ENCOUNTER
Patient was started on Insulin today. Please call patient to schedule a f/u appointment at the pregnancy clinic.    Thanks!   Rosemary Olsen RDN, LD  Diabetes Care and Education

## 2021-06-11 NOTE — TELEPHONE ENCOUNTER
Pt calling back. [facility] needs dr's signature for insulin. Please fix so that pt will not have to pay full price for it.

## 2021-06-11 NOTE — TELEPHONE ENCOUNTER
Date: 9/17/2020 Status: Frank[place]   Time: 2:20 PM Length: 20   Visit Type: VIDEO VISIT RETURN [1702] Copay: $0.00   Provider: Vidya Perez NP Department: [facility] ENDOCRIN[facility]Y   Referring Provider: GEORGINA SHORE CSN: [id number]   Notes: video, send link to email, [facility] clinic

## 2021-06-11 NOTE — TELEPHONE ENCOUNTER
insulin detemir U-100 (LEVEMIR FLEXTOUCH U-100 INSULN) 100 unit/mL (3 mL) pen  6 mL  2  9/9/2020   --    Sig: Start taking 8 units every night at bedtime.  Increase as directed.  Max daily dose 30 units.      Please initiate PA for above.

## 2021-06-12 NOTE — ANESTHESIA PROCEDURE NOTES
Spinal Block    Patient location during procedure: OR  Start time: 10/29/2020 7:43 AM  End time: 10/29/2020 7:47 AM  Reason for block: primary anesthetic    Staffing:  Performing  Anesthesiologist: Yobani Cha MD    Preanesthetic Checklist  Completed: patient identified, risks, benefits, and alternatives discussed, timeout performed, consent obtained, airway assessed, oxygen available, suction available, emergency drugs available and hand hygiene performed  Spinal Block  Patient position: sitting  Prep: ChloraPrep  Patient monitoring: heart rate, cardiac monitor, continuous pulse ox and blood pressure  Approach: midline  Location: L3-4  Injection technique: single-shot  Needle type: pencil-tip   Needle gauge: 25 G      Additional Notes:  One needle pass, good CSF flow in all four quadrants, no heme appreciated

## 2021-06-12 NOTE — ANESTHESIA CARE TRANSFER NOTE
Last vitals:   Vitals:    10/29/20 0856   BP: 130/59   Pulse: 87   Resp:    Temp:    SpO2: 97%     Patient's level of consciousness is awake  Spontaneous respirations: yes  Maintains airway independently: yes  Dentition unchanged: yes  Oropharynx: oropharynx clear of all foreign objects    QCDR Measures:  ASA# 20 - Surgical Safety Checklist: WHO surgical safety checklist completed prior to induction    PQRS# 430 - Adult PONV Prevention: 4558F - Pt received => 2 anti-emetic agents (different classes) preop & intraop  ASA# 8 - Peds PONV Prevention: NA - Not pediatric patient, not GA or 2 or more risk factors NOT present  PQRS# 424 - Rochelle-op Temp Management: 4559F-8P - Body temp not recorded within timeframe  PQRS# 426 - PACU Transfer Protocol: - Transfer of care checklist used  ASA# 14 - Acute Post-op Pain: ASA14B - Patient did NOT experience pain >= 7 out of 10

## 2021-06-12 NOTE — ANESTHESIA PROCEDURE NOTES
Peripheral Block    Patient location during procedure: OR  Start time: 10/29/2020 8:40 AM  End time: 10/29/2020 8:50 AM  post-op analgesia per surgeon order as noted in medical record  Staffing:  Performing  Anesthesiologist: Yobani Cha MD  Preanesthetic Checklist  Completed: patient identified, site marked, risks, benefits, and alternatives discussed, timeout performed, consent obtained, at patient's request, airway assessed, oxygen available, suction available, emergency drugs available and hand hygiene performed  Peripheral Block  Block type: other, TAP  Prep: ChloraPrep  Patient position: supine  Patient monitoring: cardiac monitor, continuous pulse oximetry, blood pressure and heart rate  Laterality: bilateral, same technique used bilaterally  Injection technique: ultrasound guided    Ultrasound used to visualize needle placement in proximity to nerve being blocked: yes   US used to visualize anesthetic spread  Visualized anatomic structures normal  No Pathological Findings  Permanent ultrasound image captured for medical record  Sterile gel and probe cover used for ultrasound.  Needle  Needle type: Stimuplex   Needle gauge: 21 G  Needle length: 4 in  no peripheral nerve catheter placed  Assessment  Injection assessment: negative aspiration for heme, no difficulty with injection, no paresthesia on injection and incremental injection

## 2021-06-12 NOTE — ANESTHESIA POSTPROCEDURE EVALUATION
Patient: Donya Bliss  Procedure(s):  PRIMARY  SECTION  Anesthesia type: spinal    Patient location: Labor and Delivery  Last vitals:   Vitals Value Taken Time   /70 10/29/20 1724   Temp 36.9  C (98.5  F) 10/29/20 1730   Pulse 78 10/29/20 1818   Resp 16 10/29/20 1800   SpO2 98 % 10/29/20 181   Vitals shown include unvalidated device data.  Post vital signs: stable  Level of consciousness: awake and responds to simple questions  Post-anesthesia pain: pain controlled  Post-anesthesia nausea and vomiting: no  Pulmonary: unassisted, return to baseline  Cardiovascular: stable and blood pressure at baseline  Hydration: adequate  Anesthetic events: no    QCDR Measures:  ASA# 11 - Rochelle-op Cardiac Arrest: ASA11B - Patient did NOT experience unanticipated cardiac arrest  ASA# 12 - [place]le-op Mortality Rate: ASA12B - Patient did NOT die  ASA# 13 - PACU Re-Intubation Rate: NA - No ETT / LMA used for case  ASA# 10 - Composite Anes Safety: ASA10A - No serious adverse event    Additional Notes:

## 2021-06-12 NOTE — PROGRESS NOTES
"Donya Bliss is a 30 y.o. female who is being evaluated via a billable video visit.      The patient has been notified of following:     \"This video visit will be conducted via a call between you and your physician/provider. We have found that certain health care needs can be provided without the need for an in-person physical exam.  This service lets us provide the care you need with a video conversation.  If a prescription is necessary we can send it directly to your pharmacy.  If lab work is needed we can place an order for that and you can then stop by our lab to have the test done at a later time.    Video visits are billed at different rates depending on your insurance coverage. Please reach out to your insurance provider with any questions.    If during the course of the call the physician/provider feels a video visit is not appropriate, you will not be charged for this service.\"    Patient has given verbal consent to a Video visit? Yes  How would you like to obtain your AVS? AVS Preference: So[employer]Lendhart.  If dropped by the video visit, the video invitation should be sent to: Text to cell phone: [phone]  Will anyone else be joining your video visit? No        Video Start Time: 1332    Additional provider notes:     [facility]  ENDOCRINOLOGY    Gestational Diabetes 10/7/2020    Donya Bliss, 1990, [id number]          Reason for visit      1. Insulin controlled gestational diabetes mellitus (GDM) in third trimester        HPI     Donya Bliss is a very pleasant 30 y.o. old female who presents for GESTATIONAL Diabetes Mellitus.  She is currently 36w1d  pregnant . Due date is 2020   Diagnosed with GDM based on an OGTT. She hashad  GDM in prior pregnancies.   Current carbohydrate intake:consistent with recommendations of 30g-60g-60g.  I have reviewed her blood glucose logs and note that the:  Fasting readings  are:in range on current regimen  Postprandial readings are:in range on current " regimen  Current Levemir dose:8 u  Current Prandial insulin:   Blood glucose logs/meter brought in and data reviewed and incorporated into decision-making.  Planned delivery at: [facility]  OBGYN: [facility]  Therapy/Interventions in the past:  She has been seen by the Diabetes Educator- and has received instruction on carbohydrate counting and  consistency.  Records from referring provider and other sources have also been reviewed and incorporated into decision-making.      TODAY:    Donya is contacted today in follow up for GDM. She has provided BG for us today and they look good. No changes warranted today. She reports no concerns from her OB at present and she will get a growth scan tomorrow. Baby is passing BPPs and NSTs. Discussed postpartum instructions today and all questions answered to her satisfaction. She will call between now and Delivery with any further questions or concerns.       Blood Sugar Readings    9-30  F 90  B 103  L 127  D 134    10-1  F 95  B 92 (2 hours)  L --  D --    10-2  F 95  B 152  L 84  D 138    10-3  F 86  B 99  L 142  D 105 (2 hours)    10-4  F 86  B 130  L 140  D 129    10-5  F 88  B 77 (2 hours)  L 106 (2 hours)  D 119    10-6  F 87  B 99  L 146  D 111    10-7  F 90  B 107        Past Medical History       Patient Active Problem List   Diagnosis     Insulin controlled gestational diabetes mellitus (GDM) in third trimester     Gestational hypertension     Pregnant     Delivery normal        Past Surgical History     Past Surgical History:   Procedure Laterality Date     REDUCTION MAMMAPLASTY  2009     WISDOM TOOTH EXTRACTION Bilateral 2008       Family History     No family history on file.    Social History     Social History     Tobacco Use     Smoking status: Never Smoker     Smokeless tobacco: Never Used   Substance Use Topics     Alcohol use: No     Drug use: No       Review of Systems     Patient has no polyuria or polydipsia, no chest pain, dyspnea or TIA's, no numbness,  tingling or pain in extremities  Remainder negative except as noted in HPI.      Vital Signs     There were no vitals taken for this visit.  Wt Readings from Last 3 Encounters:   20 198 lb (89.8 kg)   18 210 lb (95.3 kg)   05/10/18 210 lb (95.3 kg)           Assessment     1. Insulin controlled gestational diabetes mellitus (GDM) in third trimester        Plan       1. GESTATIONAL DIABETES-  Adjust dose as follows:     -Levemir insulin8   units. Increase by 2 units every 2 days to keep fasting blood glucose below 95mg/dL  -Novolog 0  units with breakfast  -Novolog 0 units with lunch   -Novolog 0 units with dinner  -Increase by 0 units every 2 days to keep 1 hour after meal blood glucose less than 140mg/dL     We reviewed glucose goals of fasting blood glucose <95 mg/dL and 1 hour post prandial blood glucose of <140 mg/dL.    Monitor blood sugar 4 times daily: Fasting  and 1 hour after each meal.  Contact  this clinic [phone] if blood glucose is not within the above-mentioned goals.      We discussed the importance of excellent glycemic control during pregnancy to limit complications such as fetal macrosomia, shoulder dystocia,  hypoglycemia and hyperbilirubinemia.  I have discussed the patient's increased risk of recurrent GDM and/or development of type 2 diabetes later in life.         F/up with A1c 3 months postpartum with PCP.    May be a candidate for metformin postpartum as she has more than 1 risk factor for future Type 2 Diabetes Mellitus.    She will need a screening A1c at least annually, TLC- including carbohydrate control and exercise.    After you deliver the baby, it is suggested to check your blood sugar occasionally (1 - 2 times per week) for 6 weeks.     When you are not pregnant, normal blood sugars are:       Fasting (before eating anything in the morning)  - under 100 mg/dl    2 hours after meals under 140  The American Diabetes Association recommends:     a glucose  tolerance test 6 weeks after you deliver the baby.         a hemoglobin Alc test yearly after delivery.  The Alc test is a 2-3 month average blood sugar reading.        Discuss getting these tests with your provider.       After delivery, and your provider has said that it is safe to be active, work toward getting 150 minutes each week of physical activity to decrease your risk of  getting type 2 diabetes.       Maintaining a healthy body weight will also decrease your risk of getting type 2 diabetes.             Lab Results     Creatinine   Date Value Ref Range Status   05/10/2018 0.65 0.60 - 1.10 mg/dL Final   04/24/2018 0.65 0.60 - 1.10 mg/dL Final   04/17/2018 0.62 0.60 - 1.10 mg/dL Final       No results found for: CHOL, HDL, LDLCALC, TRIG    Lab Results   Component Value Date    ALT <9 05/10/2018    AST 12 05/10/2018         Current Medications     Outpatient Medications Prior to Visit   Medication Sig Dispense Refill     acetone, urine, test (ACETONE, URINE, TEST) Strp Test ketones once a day 100 strip 2     blood glucose test (ACCU-CHEK GUIDE TEST STRIPS) strips Test 4 times daily 150 strip 2     blood-glucose meter Misc Please dispense Accu Check Guide meter +compatible  testing supplies OR any meter + compatible testing supplies per insurance formualry 1 each 0     cholecalciferol, vitamin D3, 1,000 unit tablet Take 1,000 Units by mouth daily.       ferrous sulfate (IRON ORAL) Take by mouth.       folic acid (FOLVITE) 1 MG tablet Take 1 mg by mouth daily. Take 3 tabs daily       generic lancets Test 4 times daily 150 each 2     HUMULIN N NPH INSULIN KWIKPEN 100 unit/mL (3 mL) pen Start taking 8 units every night.  Increase as directed.  Max daily dose 30 units. 6 mL 2     insulin detemir U-100 (LEVEMIR FLEXTOUCH U-100 INSULN) 100 unit/mL (3 mL) pen Start taking 8 units every night at bedtime.  Increase as directed.  Max daily dose 30 units. 6 mL 2     pen needle, diabetic (BD ULTRA-FINE HOMER PEN NEEDLE) 32  "gauge x 5/32\" Ndle Use daily with insulin pen 100 each 5     prenatal vitamin iron-folic acid 27mg-0.8mg (PRENATAL S) 27 mg iron- 800 mcg Tab tablet Take 1 tablet by mouth daily.       aspirin 81 mg chewable tablet Chew 81 mg daily.       No facility-administered medications prior to visit.          Video-Visit Details    Type of service:  Video Visit    Video End Time (time video stopped): 1352  Originating Location (pt. Location): Home    Distant Location (provider location):  [facility]     Platform used for Video Visit: Aleksandra PORTER-ROCIO      "

## 2021-06-12 NOTE — ANESTHESIA PREPROCEDURE EVALUATION
Anesthesia Evaluation      Patient summary reviewed   No history of anesthetic complications     Airway   Mallampati: II  Neck ROM: full   Pulmonary - negative ROS and normal exam   (-) not a smoker                         Cardiovascular - normal exam  (+) hypertension, ,        ROS comment: Gestational HTN; Hgb 11.     Neuro/Psych      Comments: Migraines.    Endo/Other    (+) diabetes mellitus, obesity, pregnant     Comments: GDM.    GI/Hepatic/Renal - negative ROS           Dental - normal exam                        Anesthesia Plan  Planned anesthetic: spinal and peripheral nerve block  Decadron, Zofran.  PE infusion.  Duramorph.  TAP blocks for POP per surgeon request.  Toradol, Tylenol.  ASA 2     Anesthetic plan and risks discussed with: patient  Anesthesia plan special considerations: antiemetics,   Post-op plan: routine recovery

## 2021-06-16 PROBLEM — O24.414 INSULIN CONTROLLED GESTATIONAL DIABETES MELLITUS (GDM) IN THIRD TRIMESTER: Status: ACTIVE | Noted: 2018-04-14

## 2021-06-16 PROBLEM — O32.9XX0 MALPRESENTATION BEFORE ONSET OF LABOR: Status: ACTIVE | Noted: 2020-10-28

## 2021-06-16 PROBLEM — Z87.59 HISTORY OF SHOULDER DYSTOCIA IN PRIOR PREGNANCY: Status: ACTIVE | Noted: 2020-10-28

## 2021-06-16 NOTE — PROGRESS NOTES
Assessment/Plan:    Assessment & Plan   Donya was seen today for new problem - ed referred.    Diagnoses and all orders for this visit:    Calculus of ureter    Hydronephrosis with urinary obstruction due to ureteral calculus    Calculus of kidney  -     Patient Stated Goal: Prevent further stones  -     24 Hour Urine Collection Steps Education        Stone Management Plan  [facility] 3/24/2021   Urinary Tract Infection No suspicion of infection   Renal Colic Asymptomatic at this time   Renal Failure No suspicion of renal failure   Current CT date 3/23/2021   Right sided stones? Yes   R Number of ureteral stones 1   R GSD of ureteral stones 4   R Location of ureteral stone Distal   R Number of kidney stones  No renal stones   R GSD of kidney stones 2 - 4   R Hydronephrosis Mild   R Stone Event New stone passed prior to visit   R Current Plan Observe   Observe rationale Limited stone burden with good prognosis for spontaneous passage   Left sided stones? Yes   L Number of ureteral stones No ureteral stones   L Number of kidney stones  > 10   L GSD of kidney stones 2 - 4   L Hydronephrosis None   L Stone Event No current event   L Current Plan Observe   Observe rationale Limited stone burden with good prognosis for spontaneous passage         PLAN    31 yo F first time stone former with recently diagnosed obstructing right UVJ stone, asymptomatic. Several, small, bilateral renal stones.    Will initiate comprehensive stone risk evaluation. Two 24 hour urine collections and dietary journal will be obtained at earliest covenience. Patient verbalized understanding. Patient agrees with plan as discussed. She will return to clinic when labs are available.      Recommend she continue to strain urine through the remainder of the week. She is aware to call our office if stone is captured or symptoms re-emerge. Over the counter symptom control medications of ibuprofen, Dramamine and Tylenol were recommended.    Phone  call duration: 8 minutes  14 minutes spent on the date of the encounter doing chart review, history and exam, documentation and further activities as noted above    Susan Mims PA-C  [facility]    Subjective:      HPI  Ms. Donya Bliss is a 30 y.o.  female who is being evaluated via a billable telephone visit by [facility] following [facility] ER visit for urolithiasis.    She is a first time unidentified composition stone former. She has no identified modifiable stone risk factors. She has identified non-modifiable stone risks including:  bilateral stones.    She was seen in ER yesterday for acute onset right lower abdominal pain occurring a few hours PTA. The pain was severe, pressure-like and radiated into the right flank and bladder regions. She had associated chills, nausea, vomiting, diarrhea, and urinary frequency. Workup was notable for CT reporting an obstructing right ureteral stone and bilateral renal stones. Labs were negative for renal injury or acute infection concern. She was sent home with zofran.    She has been asymptomatic since ER discharge. She has not seen a stone pass. She denies current symptoms of fever, chills, flank pain, nausea, vomiting, urinary frequency and dysuria.     CT scan from 3/23/21 is personally reviewed and demonstrates a mildly obstructing < 4 mm right UVJ stone. Several bilateral renal stones, ranging 1- 3 mm.    Significant labs from presentation include mild hematuria, mild pyuria, negative nitrite, few bacteria, no growth on urine culture, elevated WBC, slightly elevated C reactive protein, normal creatinine and normal potassium.     ROS   A 12 point comprehensive review of systems is negative except for HPI    Past Medical History:   Diagnosis Date     Gestational diabetes      Hypertension     gestational HTN     Migraines        Past Surgical History:   Procedure Laterality Date       "SECTION N/A 10/29/2020    Procedure: PRIMARY  SECTION;  Surgeon: Marylou Roman MD;  Location: [facility] L+D OR;  Service: Obstetrics     REDUCTION MAMMAPLASTY  2009     WISDOM TOOTH EXTRACTION Bilateral 2008       Current Outpatient Medications   Medication Sig Dispense Refill     AUROVELA FE 1.5/30, 28, 1.5 mg-30 mcg (21)/75 mg (7) per tablet Take 1 tablet by mouth daily.       blood-glucose meter Misc Please dispense Accu Check Guide meter +compatible  testing supplies OR any meter + compatible testing supplies per insurance formualry 1 each 0     dimenhyDRINATE (DRAMAMINE) 50 MG tablet Take 50mg at bedtime every night until stone passes, in addition, take every 6 hours as needed for nausea or pain. 15 tablet 0     docusate sodium (COLACE) 100 MG capsule Take 1 capsule (100 mg total) by mouth 2 (two) times a day as needed for constipation. 60 capsule 0     generic lancets Test 4 times daily 150 each 2     ibuprofen (ADVIL,MOTRIN) 600 MG tablet Take 1 tablet (600 mg total) by mouth every 6 (six) hours as needed for pain. 40 tablet 0     pen needle, diabetic (BD ULTRA-FINE HOMER PEN NEEDLE) 32 gauge x 32\" Ndle Use daily with insulin pen 100 each 5     prenatal vitamin iron-folic acid 27mg-0.8mg (PRENATAL S) 27 mg iron- 800 mcg Tab tablet Take 1 tablet by mouth daily.       cholecalciferol, vitamin D3, 1,000 unit tablet Take 1,000 Units by mouth daily.       ferrous sulfate (IRON ORAL) Take by mouth.       ondansetron (ZOFRAN ODT) 4 MG disintegrating tablet Take 1 tablet (4 mg total) by mouth every 8 (eight) hours as needed. 4mg 10 tablet 0     oxyCODONE (ROXICODONE) 5 MG immediate release tablet Take 1 tablet (5 mg total) by mouth every 6 (six) hours as needed for pain. 12 tablet 0     No current facility-administered medications for this visit.        No Known Allergies    Social History     Socioeconomic History     Marital status: Single     Spouse name: Not on file     Number of children: " Not on file     Years of education: Not on file     Highest education level: Not on file   Occupational History     Not on file   Social Needs     Financial resource strain: Not on file     Food insecurity     Worry: Not on file     Inability: Not on file     Transportation needs     Medical: Not on file     Non-medical: Not on file   Tobacco Use     Smoking status: Never Smoker     Smokeless tobacco: Never Used   Substance and Sexual Activity     Alcohol use: No     Drug use: No     Sexual activity: Yes     Partners: Male     Birth control/protection: None     Comment: Denies intercourse in last 24 hours. 07/26/20   Lifestyle     Physical activity     Days per week: Not on file     Minutes per session: Not on file     Stress: Not on file   Relationships     Social connections     Talks on phone: Not on file     Gets together: Not on file     Attends Taoism service: Not on file     Active member of club or organization: Not on file     Attends meetings of clubs or organizations: Not on file     Relationship status: Not on file     Intimate partner violence     Fear of current or ex partner: Not on file     Emotionally abused: Not on file     Physically abused: Not on file     Forced sexual activity: Not on file   Other Topics Concern     Not on file   Social History Narrative     Not on file       No family history on file.    Objective:      No vitals or physical exam obtained due to virtual visit    Labs    Urinalysis POC (Office):  Nitrite, UA   Date Value Ref Range Status   03/23/2021 Negative Negative Final       Lab Urinalysis:  Blood, UA   Date Value Ref Range Status   03/23/2021 0.1 mg/dL (!) Negative Final     Nitrite, UA   Date Value Ref Range Status   03/23/2021 Negative Negative Final     Leukocytes, UA   Date Value Ref Range Status   03/23/2021 250 Mohamud/uL (!) Negative Final     pH, UA   Date Value Ref Range Status   03/23/2021 6.0 5.0 - 8.0 Final    and Acute Labs   CBC   WBC   Date Value Ref Range  Status   03/23/2021 14.0 (H) 4.0 - 11.0 thou/uL Final   10/29/2020 9.2 4.0 - 11.0 thou/uL Final   05/21/2018 14.7 (H) 4.0 - 11.0 thou/uL Final     Hemoglobin   Date Value Ref Range Status   03/23/2021 14.1 12.0 - 16.0 g/dL Final   10/30/2020 9.6 (L) 12.0 - 16.0 g/dL Final   10/29/2020 11.0 (L) 12.0 - 16.0 g/dL Final     Platelets   Date Value Ref Range Status   03/23/2021 231 140 - 440 thou/uL Final   10/29/2020 194 140 - 440 thou/uL Final   05/21/2018 321 140 - 440 thou/uL Final   , C Reactive Protein    CRP   Date Value Ref Range Status   03/23/2021 1.4 (H) 0.0 - 0.8 mg/dL Final   , Renal Panel  [facility]  Creatinine   Date Value Ref Range Status   03/23/2021 0.96 0.60 - 1.10 mg/dL Final   05/10/2018 0.65 0.60 - 1.10 mg/dL Final   04/24/2018 0.65 0.60 - 1.10 mg/dL Final     Potassium   Date Value Ref Range Status   03/23/2021 3.9 3.5 - 5.0 mmol/L Final     Calcium   Date Value Ref Range Status   03/23/2021 8.9 8.5 - 10.5 mg/dL Final    and Urine Culture    Culture   Date Value Ref Range Status   03/23/2021 No Growth  Final

## 2021-06-16 NOTE — PATIENT INSTRUCTIONS - HE
Patient Stated Goal: Prevent further stones  Steps for collecting a 24 hour urine specimen    Please follow the directions carefully. All urine voided for a 24-hour period needs to be collected into the jug.  DO NOT change any of your  normal  daily habits when doing this test. Continue to follow your regular diet, intake of fluids, and usual activity level. Pick the most convenient day with your schedule, perhaps on a weekend or a day off.    Start your Diet Log the day before collection and continue on the day of urine collection.  You MUST bring Diet Log with you on follow up visit to discuss results.    One 24hr Urine Collection     Two 24hr Urine Collections  (do not collect on consecutive days)    PLEASE COMPLETE THE 2nd JUG WITHIN 1-2 WEEKS FROM THE 1st JUG    STEP 1  Empty your bladder completely into the toilet. This will be your start time. Write your full legal name, start date and time on the jug label.  Collection start and stop times need to match exactly!  For example:  6 am to 6 am.    STEP 2  The next time you urinate, empty your bladder directly into the jug or collection hat and pour urine into the jug.  Screw the lid back onto the jug.  Do not spill!    STEP 3  Place the jug in the refrigerator or a cooler with ice during the collection period.  Failure to keep it cool could cause inaccurate test results. DO NOT Freeze.    STEP 4  Continue collecting all urine into the jug for the rest of the day, for the full 24 hours.  DO NOT stop early or go over 24 hours!    STEP 5  Exactly 24 hours from start of collection, write your full legal name, stop date and time on the jug label.   Collection start and stop times need to match exactly!  For example:  6 am to 6 am.  Failure to label correctly will result in recollection of urine specimen.    STEP 6  Return each jug within 24 hours after final urination.     STEP 7  Drop off jug locations:   [facility]: Mon-Fri 7am-7pm - Closed on  weekends  [facility]: Mon-Fri 7am-5pm - Closed on Sunday  [facility]: Mon-Fri 7am-6:30pm - Closed on weekends    STEP 8  Please call [facility] after return of your final jug to schedule your follow-up visit. [phone]

## 2021-06-17 NOTE — PROGRESS NOTES
"Chief Complaint   Patient presents with     pregnancy problems     HTN in high 140s, has been having diarrhea, nausea, 33 weeks preg      History of Present Illness:    Donya Hoff is a 26 y/o female who presents with concerns about nausea and emesis.  The patient denies any abdominal pain, shortness of breath no chest pain.  The patient is 33 weeks pregnant.  Reports episodes of hypertension which was diagnosed during pregnancy.  Patient reports that her blood pressures have been running high with SBP in 140s.    Review of systems: See history of present illness, otherwise negative.   Current Outpatient Prescriptions   Medication Sig Dispense Refill     acetone, urine, test (ACETONE, URINE, TEST) Strp Use 1 each As Directed daily before breakfast. 50 strip 3     blood glucose test (CONTOUR NEXT STRIPS) strips Use 1 each As Directed 4 (four) times a day. 100 strip 3     generic lancets (FINGERSTIX LANCETS) 1 each by In Vitro route 4 (four) times a day. 100 each 3     insulin detemir U-100 (LEVEMIR FLEXTOUCH U-100 INSULN) 100 unit/mL (3 mL) pen Inject 8 Units under the skin daily. 3 mL 3     pen needle, diabetic (BD ULTRA-FINE HOMER PEN NEEDLES) 32 gauge x 5/32\" Ndle Inject 1 each under the skin daily. 100 each 3     metoclopramide (REGLAN) 10 MG tablet Take 1 tablet (10 mg total) by mouth 4 (four) times a day. 30 tablet 0     No current facility-administered medications for this visit.       No past medical history on file.   No past surgical history on file.   Social History     Social History     Marital status: Single     Spouse name: N/A     Number of children: N/A     Years of education: N/A     Social History Main Topics     Smoking status: Never Smoker     Smokeless tobacco: Never Used     Alcohol use None     Drug use: None     Sexual activity: Not Asked     Other Topics Concern     None     Social History Narrative      History   Smoking Status     Never Smoker   Smokeless Tobacco     Never Used    "   Exam:   Blood pressure 136/84, pulse (!) 108, temperature 98.2  F (36.8  C), temperature source Oral, weight 209 lb (94.8 kg), SpO2 99 %.   General: Pleasant 26 y/o female in NAD.  HEENT: Atraumatic, normocephalic, pupils equal, round, reactive to light. Conjunctivae pink. Sclerae anicteric. Tympanic membranes clear. Oropharynx clear.  NECK: No lymphadenopathy or thyromegaly or JVD.  Respiratory: Lungs are clear to auscultation and percussion.  CVS: Regular rate and rhythm without murmur, rub, or gallop.  GI: Normal bowel sounds. Soft, nontender. No hepatosplenomegaly.  EXTREMITIES: No cyanosis, clubbing, or edema. 2+ peripheral pulses.      No results found for this or any previous visit (from the past 24 hour(s)).   Assessment/Plan   1. Pregnant     2. Nausea & vomiting        There are no Patient Instructions on file for this visit.   Lay Rendon,

## 2021-06-17 NOTE — PROGRESS NOTES
Pt presents to [facility] for c/o headache and high blood pressure after checking it at [facility]. BP initially high upon arrival. No clonus, absent reflexes, no swelling. Dr. Velarde updated and wanted patient to take vistaril and have HELLP labs drawn. Labs are WNL and order to discharge home was given. Questions and concerns answered.     Unique Aggarwal

## 2021-06-17 NOTE — PROGRESS NOTES
[facility]  ENDOCRINOLOGY    Gestational Diabetes 2018    Donya Hoff, 1990, [id number]          Reason for visit      1. Insulin controlled gestational diabetes mellitus (GDM) in third trimester        HPI     Donya Hoff is a very pleasant 27 y.o. old female who presents for GESTATIONAL Diabetes Mellitus.  She is currently 34w  pregnant . Due date is 18   Diagnosed with GDM based on an OGTT. She hasnot had  GDM in prior pregnancies.   Current carbohydrate intake:consistent with recommendations of 30g-60g-60g.  I have reviewed her blood glucose logs and note that the:  Fasting readings  are:in range on current regimen  Postprandial readings are:in range on current regimen  Current Levemir dose: 8 units  Current Prandial insulin: 0  Blood glucose logs/meter brought in and data reviewed and incorporated into decision-making.  [facility]    Therapy/Interventions in the past:  She has been seen by the Diabetes Educator- and has received instruction on carbohydrate counting and  consistency.  Records from referring provider and other sources have also been reviewed and incorporated into decision-making.      TODAY:  Donya is here today after recently starting insulin for Gestational Diabetes.  She has brought in her logbook (the back of her Gestational Guidebook). She has two elevations in her FBS. Instructed to increase to 10 units if she has another elevation in the next 4 days. PP readings are good, with a couple of explainable elevations.  She reports that baby is large - and in the 84th percentile. Dr has no reported concerns with the exception that her BP was elevated recently and they were watching it.  Her BP has returned to more normal rates. Baby has been passing NSTs and BPPs. Baby is also moving appropriately and she is having no swelling.     Past Medical History       Patient Active Problem List   Diagnosis     Insulin controlled  gestational diabetes mellitus (GDM) in third trimester        Past Surgical History     History reviewed. No pertinent surgical history.    Family History     History reviewed. No pertinent family history.    Social History     Social History   Substance Use Topics     Smoking status: Never Smoker     Smokeless tobacco: Never Used     Alcohol use None       Review of Systems     Patient has no polyuria or polydipsia, no chest pain, dyspnea or TIA's, no numbness, tingling or pain in extremities  Remainder negative except as noted in HPI.      Vital Signs     /78 (Patient Site: Right Arm, Patient Position: Sitting, Cuff Size: Adult Regular)  Pulse 88  Wt 207 lb 9.6 oz (94.2 kg)  Wt Readings from Last 3 Encounters:   04/12/18 207 lb 9.6 oz (94.2 kg)   04/04/18 209 lb (94.8 kg)   04/03/18 208 lb (94.3 kg)       Physical Exam     GENERAL: Pleasant, alert, appropriate appearance for age. No acute distress,   HEENT: Normocephalic, atraumatic  NECK: Supple, no masses or  lymph nodes.  THYROID: No nodules or enlargement. Non-tender, no bruit  CHEST/RESPIRATORY: Normal chest wall and respirations. Clear to auscultation.  CARDIOVASCULAR: Regular rate and rhythm. S1, S2, no murmur, click, gallop, or rubs.  ABDOMEN: Gravid   LYMPHATIC: No palpable nodes in neck, supraclavicular,  SKIN: No melanosis,  ecchymosis,  vitiligo. No acanthosis nigricans  NEURO:  Non-focal, normal DTRs; no tremor.  PSYCH: Alert and oriented -appropriate affect. Orientation, judgement and memory appear intact.  MSK: No joint abnormalities, FROM in all four extremities. No kyphosis    Assessment     1. Insulin controlled gestational diabetes mellitus (GDM) in third trimester        Plan       1. GESTATIONAL DIABETES-  Adjust dose as follows:    -Levemir insulin 8  units. Increase by 2 units every 2 days to keep fasting blood glucose below 95mg/dL  -Novolog 0  units with breakfast  -Novolog 0 units with lunch   -Novolog 0 units with  "dinner  -Increase by 0 units every 2 days to keep 1 hour after meal blood glucose less than 140mg/dL    We reviewed glucose goals of fasting blood glucose <95 mg/dL and 1 hour post prandial blood glucose of <140 mg/dL.    Monitor blood sugar 4 times daily: Fasting  and 1 hour after each meal.  Contact  this clinic [phone] if blood glucose is not within the above-mentioned goals.     We discussed the importance of excellent glycemic control during pregnancy to limit complications such as fetal macrosomia, shoulder dystocia,  hypoglycemia and hyperbilirubinemia.  I have discussed the patient's increased risk of recurrent GDM and/or development of type 2 diabetes later in life.      Time spent with pt today: 25 min with >50% spent in counseling and coordination of care.        Vidya Perez  2018      Lab Results     Creatinine   Date Value Ref Range Status   2018 0.58 (L) 0.60 - 1.10 mg/dL Final   2018 0.63 0.60 - 1.10 mg/dL Final   2018 0.60 0.60 - 1.10 mg/dL Final       No results found for: CHOL, HDL, LDLCALC, TRIG    Lab Results   Component Value Date    ALT 10 2018    AST 11 2018         Current Medications     Outpatient Medications Prior to Visit   Medication Sig Dispense Refill     acetone, urine, test (ACETONE, URINE, TEST) Strp Use 1 each As Directed daily before breakfast. 50 strip 3     blood glucose test (CONTOUR NEXT STRIPS) strips Use 1 each As Directed 4 (four) times a day. 100 strip 3     generic lancets (FINGERSTIX LANCETS) 1 each by In Vitro route 4 (four) times a day. 100 each 3     insulin detemir U-100 (LEVEMIR FLEXTOUCH U-100 INSULN) 100 unit/mL (3 mL) pen Inject 8 Units under the skin daily. 3 mL 3     metoclopramide (REGLAN) 10 MG tablet Take 1 tablet (10 mg total) by mouth 4 (four) times a day. 30 tablet 0     pen needle, diabetic (BD ULTRA-FINE HOMER PEN NEEDLES) 32 gauge x 5/32\" Ndle Inject 1 each under the skin daily. 100 each 3     No " facility-administered medications prior to visit.

## 2021-06-17 NOTE — PROGRESS NOTES
"[facility] GDM Care Plan      Assessment/Plan: Pt seen today for f/u. She was started on levemir on 4/3/18. Still at 8 units at HS. She has had 2 readings >95. Discussed if she has another reading >95 in the next week to increase to 10 units. To increase by 2 units every 2 readings >95. Pp readings are controlled. A few elevations, which are explainable. Ketones are normal. Pt will call prior to f/u with any questions/concerns.         Time: 30  Visit Type: pregnancy clinic   Provider: Abel  Provider's Diagnosis (per referral form): Gestational (648.83)  OGTT: No results found for this or any previous visit.   Estimated Date of Delivery: 5/24/18  Pregnancy #: 1  Previous GDM: No   Medications:   Current Outpatient Prescriptions:      acetone, urine, test (ACETONE, URINE, TEST) Strp, Use 1 each As Directed daily before breakfast., Disp: 50 strip, Rfl: 3     aspirin 81 mg chewable tablet, Chew 81 mg daily., Disp: , Rfl:      blood glucose test (CONTOUR NEXT STRIPS) strips, Use 1 each As Directed 4 (four) times a day., Disp: 100 strip, Rfl: 3     cholecalciferol, vitamin D3, 1,000 unit tablet, Take 1,000 Units by mouth daily., Disp: , Rfl:      folic acid (FOLVITE) 1 MG tablet, Take 1 mg by mouth daily. Take 3 tabs daily, Disp: , Rfl:      generic lancets (FINGERSTIX LANCETS), 1 each by In Vitro route 4 (four) times a day., Disp: 100 each, Rfl: 3     insulin detemir U-100 (LEVEMIR FLEXTOUCH U-100 INSULN) 100 unit/mL (3 mL) pen, Inject 8 Units under the skin daily., Disp: 3 mL, Rfl: 3     metoclopramide (REGLAN) 10 MG tablet, Take 1 tablet (10 mg total) by mouth 4 (four) times a day., Disp: 30 tablet, Rfl: 0     pen needle, diabetic (BD ULTRA-FINE HOMER PEN NEEDLES) 32 gauge x 5/32\" Ndle, Inject 1 each under the skin daily., Disp: 100 each, Rfl: 3     prenatal vitamin iron-folic acid 27mg-0.8mg (PRENATAL S) 27 mg iron- 800 mcg Tab tablet, Take 1 tablet by mouth daily., Disp: , Rfl:       BG monitoring goals: Fasting <95; 1 " hour post start of meal <140. Test 4 x per day.  Check fasting a.m. ketones: Yes  GDM meal pattern/carb counting taught per guidelines: Yes    Past Goals:  Nutrition: GDM meal plan MET  Activity: Walking after meals when able/staying active MET  Monitoring: BG 4x/day as directed, ketones every morning MET      New Goals:  Nutrition: GDM meal plan   Activity: Walking after meals when able/staying active   Monitoring: BG 4x/day as directed, ketones every morning    DIABETES CARE PLAN AND EDUCATION RECORD    Gestational Diabetes Disease Process/Preconception Care/Management During Pregnancy/Postpartum:Discussed    Meter (per above goals): Discussed    Nutrition Management    Weight: Assessed and Discussed  Portions/Balance: Assessed and Discussed  Carb ID/Count: Assessed and Discussed  Label Reading: Assessed and Discussed  Menu Planning: Assessed and Discussed  Dining Out: Assessed and Discussed  Physical Activity: Assessed and Discussed    Acute Complications: Prevent, Detect, Treat:    Goal Setting and Problem Solving: Assessed and Discussed  Barriers: Assessed and Discussed  Psychosocial Adjustments: Assessed and Discussed      I agree with the aforementioned diabetes plan.  Vidya [facility]OLON [facility]  [facility] Endocrinology  4/12/2018  8:56 AM

## 2021-06-17 NOTE — TELEPHONE ENCOUNTER
Telephone Encounter by Caprice Cain at 9/18/2020  9:35 AM     Author: Caprice Cain Service: -- Author Type: --    Filed: 9/18/2020  9:35 AM Encounter Date: 9/16/2020 Status: Signed    : Caprice Cain APPROVED:    Approval start date: 8/18/2020  Approval end date:  9/18/2023     Pharmacy has been notified of approval and will contact patient when medication is ready for pickup.

## 2021-06-17 NOTE — PROGRESS NOTES
[facility] GDM Care Plan    Assessment:  Donya is here for gestational diabetes follow up.   She is eating 3 meals/3 snacks.   She feels like she is getting enough to eat.   Communicates her frustration with fluctuating BG and concerned about the FBS.  Ketones negative  BG Results:    FBS:  98, 89, 100, 110, 96, 107, 96, 79, 95, 97  mg/dl  1 hr , 115, 135, 142,145 (cereal), 200 (waffles),   1 hr L: 136, 133, 80, 135, 81, 114 mg/dl  1 hr D 175 (out), 154(cookie), 135, 138, 120, 162 (Easter), 133    Plan:  Following protocol, to start 8 units Levemir insulin at bedtime (9-10 pm).  To increase by 2 units every 2 days until FBS  <95 mg/dl.  She was able to return demo on insulin pen.  Reviewed signs/treatment for hypoglycemia.  Continue to check FBS and one hour after each meal.    Follow nutrition recommendations for GDM including 3 meals and 3 snacks.  She communicates her plan in following the recommendations for meals, she is keenly aware of reasons post meals are elevated.   Check urine ketones in am.    To call if 3 or more readings outside goal range in one week.  Follow up appt scheduled next week 4/12/18 at the pregnancy clinic.      Provider: Dr. Roman  Provider's Diagnosis (per referral form): Gestational (648.83)    Weight: 208 lb (94.3 kg)  OGTT: No results found for this or any previous visit.   EDC: Estimated Date of Delivery: None noted. 5/24/2018  Pregnancy #: 1st     Medications:   Current Outpatient Prescriptions:      acetone, urine, test (ACETONE, URINE, TEST) Strp, Use 1 each As Directed daily before breakfast., Disp: 50 strip, Rfl: 3     blood glucose test (CONTOUR NEXT STRIPS) strips, Use 1 each As Directed 4 (four) times a day., Disp: 100 strip, Rfl: 3     generic lancets (FINGERSTIX LANCETS), 1 each by In Vitro route 4 (four) times a day., Disp: 100 each, Rfl: 3  BG monitoring goals: Fasting <95; 1 hour post start of meal <140. Test 4 x per day.  Check fasting a.m. ketones: Yes  GDM meal  pattern/carb counting taught per guidelines: Yes    Past Goals:  Nutrition: GDM meal plan - somewhat  Activity: Walking after meals when able/staying active as able  Monitoring: BG 4x/day as directed, ketones every morning yes      New Goals:  Nutrition: GDM meal plan   Activity: Walking after meals when able/staying active   Monitoring: BG 4x/day as directed, ketones every morning    DIABETES CARE PLAN AND EDUCATION RECORD    Gestational Diabetes Disease Process/Preconception Care/Management During Pregnancy/Postpartum:Discussed    Meter (per above goals):  Competent    Nutrition Management    Weight: Assessed and Discussed  Portions/Balance: Assessed and Discussed  Carb ID/Count: Assessed and Discussed  Label Reading: Assessed and Discussed  Menu Planning: Assessed and Discussed  Dining Out: Assessed and Discussed  Physical Activity: Assessed and Discussed    Acute Complications: Prevent, Detect, Treat:    Goal Setting and Problem Solving: Assessed and Discussed  Barriers: Assessed and Discussed  Psychosocial Adjustments: Assessed and Discussed      Time: 30 minutes   MNT  Visit Type: GDM Follow Up    I agree with the aforementioned diabetes plan.  Vidya ROLON [facility] Endocrinology  4/4/2018  6:13 AM

## 2021-06-17 NOTE — PROGRESS NOTES
"[facility] GDM Care Plan      Assessment/Plan: Pt seen today for f/u. She is up to 12 units at HS. All FBG are WNL. No low BG. PP readings are ok, unless she eats bread then she will elevate. She doesn't have elevations with other carbs, just bread. Ketones are normal. Pt does not need to f/u as she is due in a couple weeks. Discussed pp care.   She will call with any questions/concerns prior to delivery.       Time: 30  Visit Type: pregnancy clinic   Provider: Abel  Provider's Diagnosis (per referral form): Gestational (648.83)  OGTT: No results found for this or any previous visit.   Estimated Date of Delivery: 5/24/18   Pregnancy #: 1  Previous GDM: No   Medications:   Current Outpatient Prescriptions:      acetone, urine, test (ACETONE, URINE, TEST) Strp, Use 1 each As Directed daily before breakfast., Disp: 50 strip, Rfl: 3     aspirin 81 mg chewable tablet, Chew 81 mg daily., Disp: , Rfl:      blood glucose test (CONTOUR NEXT STRIPS) strips, Use 1 each As Directed 4 (four) times a day., Disp: 100 strip, Rfl: 3     cholecalciferol, vitamin D3, 1,000 unit tablet, Take 1,000 Units by mouth daily., Disp: , Rfl:      folic acid (FOLVITE) 1 MG tablet, Take 1 mg by mouth daily. Take 3 tabs daily, Disp: , Rfl:      generic lancets (FINGERSTIX LANCETS), 1 each by In Vitro route 4 (four) times a day., Disp: 100 each, Rfl: 3     insulin detemir U-100 (LEVEMIR FLEXTOUCH U-100 INSULN) 100 unit/mL (3 mL) pen, Inject 8 Units under the skin daily. (Patient taking differently: Inject 12 Units under the skin daily. ), Disp: 3 mL, Rfl: 3     metoclopramide (REGLAN) 10 MG tablet, Take 1 tablet (10 mg total) by mouth 4 (four) times a day., Disp: 30 tablet, Rfl: 0     pen needle, diabetic (BD ULTRA-FINE HOMER PEN NEEDLES) 32 gauge x 5/32\" Ndle, Inject 1 each under the skin daily., Disp: 100 each, Rfl: 3     prenatal vitamin iron-folic acid 27mg-0.8mg (PRENATAL S) 27 mg iron- 800 mcg Tab tablet, Take 1 tablet by mouth daily., Disp: , " Rfl:       BG monitoring goals: Fasting <95; 1 hour post start of meal <140. Test 4 x per day.  Check fasting a.m. ketones: Yes  GDM meal pattern/carb counting taught per guidelines: Yes    Past Goals:  Nutrition: GDM meal plan MET  Activity: Walking after meals when able/staying active MET  Monitoring: BG 4x/day as directed, ketones every morning MET      New Goals:  Nutrition: GDM meal plan   Activity: Walking after meals when able/staying active   Monitoring: BG 4x/day as directed, ketones every morning    DIABETES CARE PLAN AND EDUCATION RECORD    Gestational Diabetes Disease Process/Preconception Care/Management During Pregnancy/Postpartum:Discussed    Meter (per above goals): Discussed    Nutrition Management    Weight: Assessed and Discussed  Portions/Balance: Assessed and Discussed  Carb ID/Count: Assessed and Discussed  Label Reading: Assessed and Discussed  Menu Planning: Assessed and Discussed  Dining Out: Assessed and Discussed  Physical Activity: Assessed and Discussed    Acute Complications: Prevent, Detect, Treat:    Goal Setting and Problem Solving: Assessed and Discussed  Barriers: Assessed and Discussed  Psychosocial Adjustments: Assessed and Discussed    I agree with the aforementioned diabetes plan.  Vidya [facility]ON [facility]  [facility] Endocrinology  4/26/2018  10:43 AM

## 2021-06-18 NOTE — ANESTHESIA POSTPROCEDURE EVALUATION
Patient: Donya Hoff  * No procedures listed *  Anesthesia type: epidural    Patient location: Labor and Delivery  Last vitals:   Vitals:    05/16/18 1730   BP: 124/84   Pulse: (!) 109   Resp: 16   Temp:    SpO2:      Post vital signs: stable  Level of consciousness: awake and responds to simple questions  Post-anesthesia pain: pain controlled  Post-anesthesia nausea and vomiting: no  Pulmonary: unassisted, return to baseline  Cardiovascular: stable and blood pressure at baseline  Hydration: adequate  Anesthetic events: no    QCDR Measures:  ASA# 11 - [place]-op Cardiac Arrest: ASA11B - Patient did NOT experience unanticipated cardiac arrest  ASA# 12 - [place]-op Mortality Rate: ASA12B - Patient did NOT die  ASA# 13 - PACU Re-Intubation Rate: ASA13B - Patient did NOT require a new airway mgmt  ASA# 10 - Composite Anes Safety: ASA10A - No serious adverse event    Additional Notes:

## 2021-06-18 NOTE — ANESTHESIA PREPROCEDURE EVALUATION
Anesthesia Evaluation      Patient summary reviewed     Airway   Mallampati: II  Neck ROM: full   Pulmonary - negative ROS and normal exam    breath sounds clear to auscultation                         Cardiovascular - normal exam  (+) hypertension well controlled, ,     Rhythm: regular  Rate: normal,         Neuro/Psych - negative ROS     Endo/Other    (+) diabetes mellitus type 2 using insulin, obesity, pregnant     GI/Hepatic/Renal            Dental - normal exam                        Anesthesia Plan  Planned anesthetic: epidural    ASA 2     Anesthetic plan and risks discussed with: patient and spouse    Post-op plan: routine recovery

## 2021-06-18 NOTE — ANESTHESIA PROCEDURE NOTES
Epidural Block    Patient location during procedure: OB  Time Called: 5/16/2018 10:00 AM  Reason for Block:labor epidural  Staffing:  Performing  Anesthesiologist: BRENT ATKINS  Preanesthetic Checklist  Completed: patient identified, risks, benefits, and alternatives discussed, timeout performed, consent obtained, airway assessed, oxygen available, suction available, emergency drugs available and hand hygiene performed  Procedure  Patient position: sitting  Prep: ChloraPrep and site prepped and draped  Patient monitoring: continuous pulse oximetry, heart rate and blood pressure  Approach: midline  Location: L1-L2  Injection technique: ANGELA air  Number of Attempts:1  Needle  Needle type: Matchpingus   Needle gauge: 18 G     Catheter in Space: 6  Assessment  Sensory level: T10  No complications

## 2021-06-27 ENCOUNTER — HEALTH MAINTENANCE LETTER (OUTPATIENT)
Age: 31
End: 2021-06-27

## 2021-07-14 PROBLEM — O13.9 GESTATIONAL HYPERTENSION: Status: RESOLVED | Noted: 2018-05-11 | Resolved: 2020-10-28

## 2021-07-14 PROBLEM — Z34.90 PREGNANT: Status: RESOLVED | Noted: 2018-05-15 | Resolved: 2020-10-28

## 2021-10-17 ENCOUNTER — HEALTH MAINTENANCE LETTER (OUTPATIENT)
Age: 31
End: 2021-10-17

## 2022-07-23 ENCOUNTER — HEALTH MAINTENANCE LETTER (OUTPATIENT)
Age: 32
End: 2022-07-23

## 2022-10-01 ENCOUNTER — HEALTH MAINTENANCE LETTER (OUTPATIENT)
Age: 32
End: 2022-10-01

## 2022-10-20 LAB
ABO (EXTERNAL): NORMAL
HEPATITIS B SURFACE ANTIGEN (EXTERNAL): NONREACTIVE
HIV1+2 AB SERPL QL IA: NEGATIVE
RH (EXTERNAL): NEGATIVE
RUBELLA ANTIBODY IGG (EXTERNAL): NORMAL
VDRL (SYPHILIS) (EXTERNAL): NEGATIVE

## 2023-03-14 ENCOUNTER — MEDICAL CORRESPONDENCE (OUTPATIENT)
Dept: HEALTH INFORMATION MANAGEMENT | Facility: CLINIC | Age: 33
End: 2023-03-14

## 2023-03-15 ENCOUNTER — TRANSCRIBE ORDERS (OUTPATIENT)
Dept: OTHER | Age: 33
End: 2023-03-15

## 2023-03-15 DIAGNOSIS — K42.9 UMBILICAL HERNIA WITHOUT OBSTRUCTION AND WITHOUT GANGRENE: Primary | ICD-10-CM

## 2023-03-15 RX ORDER — NORETHINDRONE ACETATE AND ETHINYL ESTRADIOL 1.5-30(21)
1 KIT ORAL DAILY
COMMUNITY
Start: 2022-01-10 | End: 2023-03-16

## 2023-03-15 NOTE — PROGRESS NOTES
History:  Donya Bliss is a 32 year old female who was referred to general surgery by Dr. Montana for evaluation of an umbilical hernia.  She presents with complaints of a bulge at her umbilicus.  It has been present since her last pregnancy.  She had her second child in .  It will intermittently become painful.  She had met with a surgeon at the beginning of last year and they were planning on the umbilical hernia repair, but that she ended up getting pregnant.  She states that this is her last pregnancy.  She is planning a tubal ligation with her .  The hernia continues to cause her intermittent symptoms, especially if she wears the wrong pants.  Laying flat can help with the symptoms.       Allergies:  Patient has no known allergies.    Past medical history:  Gestational HTN and DM    Past surgical history:    Reduction mammoplasty  [place] tooth extraction    Current medications:     aspirin (ASA) 81 MG chewable tablet, Take 81 mg by mouth daily, Disp: , Rfl:      blood-glucose meter Misc, [BLOOD-GLUCOSE METER MISC] Please dispense Accu Check Guide meter +compatible  testing supplies OR any meter + compatible testing supplies per insurance formualry, Disp: 1 each, Rfl: 0     cholecalciferol, vitamin D3, 1,000 unit tablet, [CHOLECALCIFEROL, VITAMIN D3, 1,000 UNIT TABLET] Take 1,000 Units by mouth daily., Disp: , Rfl:      generic lancets, [GENERIC LANCETS] Test 4 times daily, Disp: 150 each, Rfl: 2     prenatal vitamin iron-folic acid 27mg-0.8mg (PRENATAL S) 27 mg iron- 800 mcg Tab tablet, [PRENATAL VITAMIN IRON-FOLIC ACID 27MG-0.8MG (PRENATAL S) 27 MG IRON- 800 MCG TAB TABLET] Take 1 tablet by mouth daily., Disp: , Rfl:     Family history:  Denies a family history of anesthesia problems    Social History:  Has 2 children.  Denies tobacco, alcohol, and illicit drug use.  Is a .  She plans on taking the entire summer off after her  in May.    Review of Systems:  "  General: No complaints or constitutional symptoms  Skin: No complaints or symptoms   Hematologic/Lymphatic: No symptoms or complaints  Psychiatric: No symptoms or complaints  Endocrine: No excessive fatigue, no hypermetabolic symptoms reported  Respiratory: No cough, shortness of breath, or wheezing  Cardiovascular: No chest pain or dyspnea on exertion  Gastrointestinal: As per HPI  Musculoskeletal: No recent injuries reported  Neurological: No focal neurologic defects reported.      Exam:  /78   Ht 1.702 m (5' 7\")   Wt 93.4 kg (206 lb)   BMI 32.26 kg/m    Body mass index is 32.26 kg/m .  General : Alert, cooperative, appears stated age   Skin: Skin color, texture, turgor normal, no rashes or lesions   Lymphatic: No obvious adenopathy, no swelling   Eyes: No scleral icterus, pupils equal  HENT: No traumatic injury to the head or face, no gross abnormalities  Lungs: Normal respiratory effort, breath sounds equal bilaterally  Heart: Regular rate and rhythm  Abdomen: Clearly pregnant with uterus above the umbilicus.  There is a protruding fat-containing umbilical hernia that is reducible with anticipated hernia defect size 2 cm  Musculoskeletal: No obvious swelling  Neurologic: Grossly intact    Imaging:   CT from last year reviewed and shows a fat-containing umbilical hernia    Assessment/Plan:   Donya Bliss is a 32 year old female with a reducible ventral (periumbilical) hernia.  The pathophysiology of hernias was discussed as were the surgical and non-operative management strategies.  We discussed both open and laparoscopic approaches, and the respective risks and benefits of each approach.  We similarly discussed the role and specific risks associated with mesh placement and primary repair.  The risks of surgery in general were discussed which include, but are not limited to, bleeding, infection, recurrent hernia, and chronic pain.  Additionally, the risks of observation were discussed which include, " but are not limited to, enlargement of the hernia, incarceration, strangulation, and pain.      She understands everything which was discussed and has consented to proceed with a ventral hernia repair.  I do not recommend doing this at the time of her .  I would like for her body habitus to get closer to her prepregnancy baseline.  She also has quite a bit of skin redundancy.  She may need an umbilicoplasty to help make it more cosmetically pleasing.  Pre-operative orders have been placed for an open ventral herniorrhaphy at the outpatient surgery center under MAC anesthesia.  Post-operative recovery and restrictions were discussed.  She will work with my surgery scheduler to pick a date for in August.    Anne Olivia DO  General Surgeon  [facility] - [address]  [place] [address]  Office: [phone]  Employed by - [facility]

## 2023-03-16 ENCOUNTER — HOSPITAL ENCOUNTER (OUTPATIENT)
Facility: AMBULATORY SURGERY CENTER | Age: 33
End: 2023-03-16
Attending: SURGERY
Payer: COMMERCIAL

## 2023-03-16 ENCOUNTER — TRANSFERRED RECORDS (OUTPATIENT)
Dept: HEALTH INFORMATION MANAGEMENT | Facility: CLINIC | Age: 33
End: 2023-03-16

## 2023-03-16 ENCOUNTER — OFFICE VISIT (OUTPATIENT)
Dept: SURGERY | Facility: CLINIC | Age: 33
End: 2023-03-16
Attending: OBSTETRICS & GYNECOLOGY
Payer: COMMERCIAL

## 2023-03-16 VITALS
BODY MASS INDEX: 32.33 KG/M2 | HEIGHT: 67 IN | WEIGHT: 206 LBS | DIASTOLIC BLOOD PRESSURE: 78 MMHG | SYSTOLIC BLOOD PRESSURE: 128 MMHG

## 2023-03-16 DIAGNOSIS — K42.9 UMBILICAL HERNIA WITHOUT OBSTRUCTION AND WITHOUT GANGRENE: ICD-10-CM

## 2023-03-16 PROCEDURE — 99204 OFFICE O/P NEW MOD 45 MIN: CPT | Performed by: SURGERY

## 2023-03-16 RX ORDER — ASPIRIN 81 MG/1
81 TABLET, CHEWABLE ORAL DAILY
Status: ON HOLD | COMMUNITY
End: 2023-05-05

## 2023-03-16 NOTE — LETTER
3/16/2023         RE: Donya Bliss  [address]  [facility] [address]        Dear Colleague,    Thank you for referring your patient, Donya Bliss, to the [facility] [place]. Please see a copy of my visit note below.    History:  Donya Bliss is a 32 year old female who was referred to general surgery by Dr. Montana for evaluation of an umbilical hernia.  She presents with complaints of a bulge at her umbilicus.  It has been present since her last pregnancy.  She had her second child in .  It will intermittently become painful.  She had met with a surgeon at the beginning of last year and they were planning on the umbilical hernia repair, but that she ended up getting pregnant.  She states that this is her last pregnancy.  She is planning a tubal ligation with her .  The hernia continues to cause her intermittent symptoms, especially if she wears the wrong pants.  Laying flat can help with the symptoms.       Allergies:  Patient has no known allergies.    Past medical history:  Gestational HTN and DM    Past surgical history:    Reduction mammoplasty  [place] tooth extraction    Current medications:     aspirin (ASA) 81 MG chewable tablet, Take 81 mg by mouth daily, Disp: , Rfl:      blood-glucose meter Misc, [BLOOD-GLUCOSE METER MISC] Please dispense Accu Check Guide meter +compatible  testing supplies OR any meter + compatible testing supplies per insurance formualry, Disp: 1 each, Rfl: 0     cholecalciferol, vitamin D3, 1,000 unit tablet, [CHOLECALCIFEROL, VITAMIN D3, 1,000 UNIT TABLET] Take 1,000 Units by mouth daily., Disp: , Rfl:      generic lancets, [GENERIC LANCETS] Test 4 times daily, Disp: 150 each, Rfl: 2     prenatal vitamin iron-folic acid 27mg-0.8mg (PRENATAL S) 27 mg iron- 800 mcg Tab tablet, [PRENATAL VITAMIN IRON-FOLIC ACID 27MG-0.8MG (PRENATAL S) 27 MG IRON- 800 MCG TAB TABLET] Take 1 tablet by mouth daily., Disp: , Rfl:     Family  "history:  Denies a family history of anesthesia problems    Social History:  Has 2 children.  Denies tobacco, alcohol, and illicit drug use.  Is a .  She plans on taking the entire summer off after her  in May.    Review of Systems:   General: No complaints or constitutional symptoms  Skin: No complaints or symptoms   Hematologic/Lymphatic: No symptoms or complaints  Psychiatric: No symptoms or complaints  Endocrine: No excessive fatigue, no hypermetabolic symptoms reported  Respiratory: No cough, shortness of breath, or wheezing  Cardiovascular: No chest pain or dyspnea on exertion  Gastrointestinal: As per HPI  Musculoskeletal: No recent injuries reported  Neurological: No focal neurologic defects reported.      Exam:  /78   Ht 1.702 m (5' 7\")   Wt 93.4 kg (206 lb)   BMI 32.26 kg/m    Body mass index is 32.26 kg/m .  General : Alert, cooperative, appears stated age   Skin: Skin color, texture, turgor normal, no rashes or lesions   Lymphatic: No obvious adenopathy, no swelling   Eyes: No scleral icterus, pupils equal  HENT: No traumatic injury to the head or face, no gross abnormalities  Lungs: Normal respiratory effort, breath sounds equal bilaterally  Heart: Regular rate and rhythm  Abdomen: Clearly pregnant with uterus above the umbilicus.  There is a protruding fat-containing umbilical hernia that is reducible with anticipated hernia defect size 2 cm  Musculoskeletal: No obvious swelling  Neurologic: Grossly intact    Imaging:   CT from last year reviewed and shows a fat-containing umbilical hernia    Assessment/Plan:   Donya Bliss is a 32 year old female with a reducible ventral (periumbilical) hernia.  The pathophysiology of hernias was discussed as were the surgical and non-operative management strategies.  We discussed both open and laparoscopic approaches, and the respective risks and benefits of each approach.  We similarly discussed the role and specific risks " associated with mesh placement and primary repair.  The risks of surgery in general were discussed which include, but are not limited to, bleeding, infection, recurrent hernia, and chronic pain.  Additionally, the risks of observation were discussed which include, but are not limited to, enlargement of the hernia, incarceration, strangulation, and pain.      She understands everything which was discussed and has consented to proceed with a ventral hernia repair.  I do not recommend doing this at the time of her .  I would like for her body habitus to get closer to her prepregnancy baseline.  She also has quite a bit of skin redundancy.  She may need an umbilicoplasty to help make it more cosmetically pleasing.  Pre-operative orders have been placed for an open ventral herniorrhaphy at the outpatient surgery center under MAC anesthesia.  Post-operative recovery and restrictions were discussed.  She will work with my surgery scheduler to pick a date for in August.    Anne Olivia DO  General Surgeon  [facility]  Surgery [address]  [address]  [place] [address]  Office: [phone]  Employed by - [facility]        Again, thank you for allowing me to participate in the care of your patient.        Sincerely,        Anne Olivia DO

## 2023-03-24 ENCOUNTER — MEDICAL CORRESPONDENCE (OUTPATIENT)
Dept: HEALTH INFORMATION MANAGEMENT | Facility: CLINIC | Age: 33
End: 2023-03-24
Payer: COMMERCIAL

## 2023-03-25 ENCOUNTER — MEDICAL CORRESPONDENCE (OUTPATIENT)
Dept: HEALTH INFORMATION MANAGEMENT | Facility: CLINIC | Age: 33
End: 2023-03-25
Payer: COMMERCIAL

## 2023-03-27 ENCOUNTER — TELEPHONE (OUTPATIENT)
Dept: ENDOCRINOLOGY | Facility: CLINIC | Age: 33
End: 2023-03-27
Payer: COMMERCIAL

## 2023-03-27 ENCOUNTER — TRANSCRIBE ORDERS (OUTPATIENT)
Dept: OTHER | Age: 33
End: 2023-03-27

## 2023-03-27 DIAGNOSIS — O24.419 GESTATIONAL DIABETES: Primary | ICD-10-CM

## 2023-03-27 NOTE — TELEPHONE ENCOUNTER
Outcome for 03/27/23 3:03 PM: Per patient, will send BG readings via [facility]  Rosalba Ledesma MA

## 2023-03-27 NOTE — PROGRESS NOTES
Outcome for 03/27/23 3:01 PM: [employer] message sent  Rosalba Ledesma MA   Outcome for 03/27/23 3:03 PM: Per patient, will send BG readings via Clara Ledesma MA  Outcome for 03/28/23 6:36 AM: Glucose readings sent via Clara Ledesma MA

## 2023-03-29 ENCOUNTER — VIRTUAL VISIT (OUTPATIENT)
Dept: ENDOCRINOLOGY | Facility: CLINIC | Age: 33
End: 2023-03-29
Attending: OBSTETRICS & GYNECOLOGY
Payer: COMMERCIAL

## 2023-03-29 ENCOUNTER — TELEPHONE (OUTPATIENT)
Dept: ENDOCRINOLOGY | Facility: CLINIC | Age: 33
End: 2023-03-29

## 2023-03-29 DIAGNOSIS — O24.410 DIET CONTROLLED GESTATIONAL DIABETES MELLITUS (GDM) IN THIRD TRIMESTER: ICD-10-CM

## 2023-03-29 DIAGNOSIS — E16.2 HYPOGLYCEMIA: Primary | ICD-10-CM

## 2023-03-29 PROCEDURE — 99203 OFFICE O/P NEW LOW 30 MIN: CPT | Mod: VID | Performed by: PHYSICIAN ASSISTANT

## 2023-03-29 RX ORDER — BLOOD-GLUCOSE SENSOR
1 EACH MISCELLANEOUS
Qty: 2 EACH | Refills: 1 | Status: SHIPPED | OUTPATIENT
Start: 2023-03-29 | End: 2023-04-14

## 2023-03-29 NOTE — NURSING NOTE
Is the patient currently in the state of [place]? YES    Visit mode:VIDEO    If the visit is dropped, the patient can be reconnected by: VIDEO VISIT: Text to cell phone: [phone]    Will anyone else be joining the visit? NO      How would you like to obtain your AVS? MyChart    Are changes needed to the allergy or medication list? NO    Reason for visit: New Diabetes

## 2023-03-29 NOTE — PROGRESS NOTES
Assessment/Plan :   1.Gestational diabetes. Donya's blood sugars are a little more elevated than I would like. She has had some odd low blood sugars over the last few days and I understand her concern regarding hypoglycemia. She has a follow-up appt with her dietician next week. I would like her to  a FreeStyle Kelley 3 sensor. This sensor is approved for use in pregnancy and it will give us a better idea of how her blood sugars are fluctuating. We will plan on a follow-up visit in 2 wks but I will reach out to the dietician. We will most likely start insulin in the next two weeks. Donya understands the plan and will contact me if she has any questions.    Due to the COVID 19 pandemic this visit was a telephone/video visit in order to help prevent spread of infection in this patient and the general population. The patient gave verbal consent for the visit today. I have independently reviewed and interpreted labs, imaging as indicated.       Distant Location (provider location):  Off-site  Mode of Communication:  Video Conference via [employer]  Chart review/prep time 10  Joined the call at 3/29/2023, 7:35:41 am.  Left the call at 3/29/2023, 7:45:25 am.  You were on the call for 9 minutes 44 seconds .  30 minutes spent on the date of the encounter doing chart review, history and exam, documentation and further activities as noted above.      Chief complaint:  Donya is a 32 year old female seen in consultation at the request of Dr. Montana for gestational diabetes.    I have reviewed Care Everywhere including [facility], [facility], [facility], [facility], [facility] , [facility], [place] lab reports, imaging reports and provider notes as indicated.      HISTORY OF PRESENT ILLNESS  This is Donya's third pregnancy. She is currently at about 32 weeks gestation. She had gestational diabetes with both of her previous pregnancies and ended up on insulin. With her last pregnancy, she was started on  8 units of Levemir at about 32 wks. She remained on 8 units until deliver. Her blood sugars seemed to normalize after pregnancy.    However, this pregnancy has been a bit different. She has been able to better manage her blood sugars through diet alone. She has been monitoring her blood sugars closely and her numbers are not as consistently elevated. She is working with a dietician and she makes sure to carbohydrates with every meal. She had a few days last week when her morning blood sugar were consistently from 95 to 100 mg/dl but then this week her numbers have been around 90 mg/dl. She had a few readings in the 80s yesterday, one was even after a meal. She is familiar with the use of insulin but she is worried about the potential for hypoglycemia at this time.      REVIEW OF SYSTEMS    Endocrine: positive for gestational diabetes  Skin: negative  Eyes: negative for, visual blurring  Ears/Nose/Throat: negative  Respiratory: No shortness of breath, dyspnea on exertion, cough, or hemoptysis  Cardiovascular: positive for lower extremity edema, negative for, irregular heart beat and chest pain  Gastrointestinal: negative for, nausea, vomiting, constipation and diarrhea  Genitourinary: negative for, nocturia, dysuria, frequency and urgency  Musculoskeletal: negative for, muscular weakness and nocturnal cramping  Neurologic: positive for headaches  Psychiatric: negative  Hematologic/Lymphatic/Immunologic: negative    Past Medical History  Past Medical History:   Diagnosis Date     Gestational diabetes      Hypertension     gestational HTN     Migraines        Medications  Current Outpatient Medications   Medication Sig Dispense Refill     aspirin (ASA) 81 MG chewable tablet Take 81 mg by mouth daily       blood-glucose meter Misc [BLOOD-GLUCOSE METER MISC] Please dispense Accu Check Guide meter +compatible  testing supplies OR any meter + compatible testing supplies per insurance formualry 1 each 0     cholecalciferol,  vitamin D3, 1,000 unit tablet [CHOLECALCIFEROL, VITAMIN D3, 1,000 UNIT TABLET] Take 1,000 Units by mouth daily.       generic lancets [GENERIC LANCETS] Test 4 times daily 150 each 2     prenatal vitamin iron-folic acid 27mg-0.8mg (PRENATAL S) 27 mg iron- 800 mcg Tab tablet [PRENATAL VITAMIN IRON-FOLIC ACID 27MG-0.8MG (PRENATAL S) 27 MG IRON- 800 MCG TAB TABLET] Take 1 tablet by mouth daily.         Allergies  No Known Allergies    Family History  family history is not on file.    Social History  Social History     Tobacco Use     Smoking status: Never     Smokeless tobacco: Never   Substance Use Topics     Alcohol use: No     Drug use: No       Physical Exam  There is no height or weight on file to calculate BMI.  GENERAL: no distress  SKIN: Visible skin clear. No significant rash, abnormal pigmentation or lesions.  EYES: Eyes grossly normal to inspection.  No discharge or erythema, or obvious scleral/conjunctival abnormalities.  NECK: visible goiter is not present; no visible neck masses  RESP: No audible wheeze, cough, or visible cyanosis.  No visible retractions or increased work of breathing.    NEURO: Awake, alert, responds appropriately to questions.  Mentation and speech fluent.  PSYCH:affect normal and appearance well-groomed.      DATA REVIEW    Please see attached glucose logs

## 2023-03-29 NOTE — PATIENT INSTRUCTIONS
A new prescription for the FreeStyle Kelley 3 has been sent to your pharmacy. Please let me know if you are unable to pick it up.    I would like to see you back in 2 wks.

## 2023-03-29 NOTE — LETTER
3/29/2023         RE: Donya Bliss  [address]  [facility] [address]        Dear Colleague,    Thank you for referring your patient, Donya Bliss, to the [facility]. Please see a copy of my visit note below.    Outcome for 03/27/23 3:01 PM: [employer] message sent  Rosalba Ledesma MA   Outcome for 03/27/23 3:03 PM: Per patient, will send BG readings via [employer]  Rosalba Ledesma MA  Outcome for 03/28/23 6:36 AM: Glucose readings sent via [employer]  Rosalba Ledesma MA                Assessment/Plan :   1.Gestational diabetes. Donya's blood sugars are a little more elevated than I would like. She has had some odd low blood sugars over the last few days and I understand her concern regarding hypoglycemia. She has a follow-up appt with her dietician next week. I would like her to  a FreeStyle Kelley 3 sensor. This sensor is approved for use in pregnancy and it will give us a better idea of how her blood sugars are fluctuating. We will plan on a follow-up visit in 2 wks but I will reach out to the dietician. We will most likely start insulin in the next two weeks. Donya understands the plan and will contact me if she has any questions.    Due to the COVID 19 pandemic this visit was a telephone/video visit in order to help prevent spread of infection in this patient and the general population. The patient gave verbal consent for the visit today. I have independently reviewed and interpreted labs, imaging as indicated.       Distant Location (provider location):  Off-site  Mode of Communication:  Video Conference via [employer]  Chart review/prep time 10  Joined the call at 3/29/2023, 7:35:41 am.  Left the call at 3/29/2023, 7:45:25 am.  You were on the call for 9 minutes 44 seconds .  30 minutes spent on the date of the encounter doing chart review, history and exam, documentation and further activities as noted above.      Chief complaint:  Donya is a 32 year old female seen in  consultation at the request of Dr. Montana for gestational diabetes.    I have reviewed Care Everywhere including [facility], [facility], [facility], [facility], [facility], [facility] , [facility], [place] lab reports, imaging reports and provider notes as indicated.      HISTORY OF PRESENT ILLNESS  This is Donya's third pregnancy. She is currently at about 32 weeks gestation. She had gestational diabetes with both of her previous pregnancies and ended up on insulin. With her last pregnancy, she was started on 8 units of Levemir at about 32 wks. She remained on 8 units until deliver. Her blood sugars seemed to normalize after pregnancy.    However, this pregnancy has been a bit different. She has been able to better manage her blood sugars through diet alone. She has been monitoring her blood sugars closely and her numbers are not as consistently elevated. She is working with a dietician and she makes sure to carbohydrates with every meal. She had a few days last week when her morning blood sugar were consistently from 95 to 100 mg/dl but then this week her numbers have been around 90 mg/dl. She had a few readings in the 80s yesterday, one was even after a meal. She is familiar with the use of insulin but she is worried about the potential for hypoglycemia at this time.      REVIEW OF SYSTEMS    Endocrine: positive for gestational diabetes  Skin: negative  Eyes: negative for, visual blurring  Ears/Nose/Throat: negative  Respiratory: No shortness of breath, dyspnea on exertion, cough, or hemoptysis  Cardiovascular: positive for lower extremity edema, negative for, irregular heart beat and chest pain  Gastrointestinal: negative for, nausea, vomiting, constipation and diarrhea  Genitourinary: negative for, nocturia, dysuria, frequency and urgency  Musculoskeletal: negative for, muscular weakness and nocturnal cramping  Neurologic: positive for headaches  Psychiatric:  negative  Hematologic/Lymphatic/Immunologic: negative    Past Medical History  Past Medical History:   Diagnosis Date     Gestational diabetes      Hypertension     gestational HTN     Migraines        Medications  Current Outpatient Medications   Medication Sig Dispense Refill     aspirin (ASA) 81 MG chewable tablet Take 81 mg by mouth daily       blood-glucose meter Misc [BLOOD-GLUCOSE METER MISC] Please dispense Accu Check Guide meter +compatible  testing supplies OR any meter + compatible testing supplies per insurance formualry 1 each 0     cholecalciferol, vitamin D3, 1,000 unit tablet [CHOLECALCIFEROL, VITAMIN D3, 1,000 UNIT TABLET] Take 1,000 Units by mouth daily.       generic lancets [GENERIC LANCETS] Test 4 times daily 150 each 2     prenatal vitamin iron-folic acid 27mg-0.8mg (PRENATAL S) 27 mg iron- 800 mcg Tab tablet [PRENATAL VITAMIN IRON-FOLIC ACID 27MG-0.8MG (PRENATAL S) 27 MG IRON- 800 MCG TAB TABLET] Take 1 tablet by mouth daily.         Allergies  No Known Allergies    Family History  family history is not on file.    Social History  Social History     Tobacco Use     Smoking status: Never     Smokeless tobacco: Never   Substance Use Topics     Alcohol use: No     Drug use: No       Physical Exam  There is no height or weight on file to calculate BMI.  GENERAL: no distress  SKIN: Visible skin clear. No significant rash, abnormal pigmentation or lesions.  EYES: Eyes grossly normal to inspection.  No discharge or erythema, or obvious scleral/conjunctival abnormalities.  NECK: visible goiter is not present; no visible neck masses  RESP: No audible wheeze, cough, or visible cyanosis.  No visible retractions or increased work of breathing.    NEURO: Awake, alert, responds appropriately to questions.  Mentation and speech fluent.  PSYCH:affect normal and appearance well-groomed.      DATA REVIEW    Please see attached glucose logs        Again, thank you for allowing me to participate in the care of  your patient.        Sincerely,        Carina Gandhi PA-C

## 2023-03-29 NOTE — TELEPHONE ENCOUNTER
Scheduled 2 week follow up with Carina Gandhi on 4/14/223 at 10:30am. Rosalba Ledesma CMA on 3/29/2023 at 9:40 AM

## 2023-04-03 ENCOUNTER — TELEPHONE (OUTPATIENT)
Dept: ENDOCRINOLOGY | Facility: CLINIC | Age: 33
End: 2023-04-03
Payer: COMMERCIAL

## 2023-04-03 DIAGNOSIS — O24.419 GESTATIONAL DIABETES MELLITUS (GDM), ANTEPARTUM, GESTATIONAL DIABETES METHOD OF CONTROL UNSPECIFIED: Primary | ICD-10-CM

## 2023-04-03 RX ORDER — ACYCLOVIR 400 MG/1
1 TABLET ORAL
Qty: 9 EACH | Refills: 3 | Status: SHIPPED | OUTPATIENT
Start: 2023-04-03 | End: 2023-04-14

## 2023-04-03 NOTE — TELEPHONE ENCOUNTER
PA Initiation    Medication: Dexcom G7 Sensor - [place] Pending   Insurance Company: [employer] - Phone [phone] Fax [phone]  Pharmacy Filling the Rx: [id number] - [place] [address] AT [facility] [address]  Filling Pharmacy Phone: [phone]  Filling Pharmacy Fax: [phone]  Start Date: 4/3/2023          Thank you,     Kelvin Gomes [facility]  Pharmacy Clinic [facility]  [email or web address]   Phone: [phone]  Fax: [phone]

## 2023-04-03 NOTE — TELEPHONE ENCOUNTER
[facility] Call Center    Phone Message    May a detailed message be left on voicemail: yes     Reason for Call: Other: Patient called in and states that the Kelley system she was prescribed is out of her price range and she is not able to afford the $100.   Please reach out to give other options. Thank you.     Action Taken: Other: Endo    Travel Screening: Not Applicable

## 2023-04-03 NOTE — TELEPHONE ENCOUNTER
See MyChart message from Donya regarding Kelley 3 continuous glucose monitor too costly.    Will submit prescription for the Dexcom G7 continuous glucose monitor which is also approved for gestational diabetes.    Monet Red RN, [facility]

## 2023-04-06 NOTE — PROGRESS NOTES
Outcome for 04/06/23 9:06 AM: [employer] message sent  Anne Nelson LPN   Outcome for 04/10/23 1:59 PM: Glucose readings sent via [employer]  Rosalba Ledesma MA

## 2023-04-07 ENCOUNTER — LAB REQUISITION (OUTPATIENT)
Dept: LAB | Facility: CLINIC | Age: 33
End: 2023-04-07
Payer: COMMERCIAL

## 2023-04-07 LAB
ALBUMIN MFR UR ELPH: <7 MG/DL (ref 1–14)
ALT SERPL W P-5'-P-CCNC: 12 U/L (ref 0–45)
AST SERPL W P-5'-P-CCNC: 14 U/L (ref 0–40)
CREAT SERPL-MCNC: 0.61 MG/DL (ref 0.6–1.1)
CREAT UR-MCNC: 22 MG/DL
ERYTHROCYTE [DISTWIDTH] IN BLOOD BY AUTOMATED COUNT: 13.2 % (ref 10–15)
GFR SERPL CREATININE-BSD FRML MDRD: >90 ML/MIN/1.73M2
HCT VFR BLD AUTO: 33 % (ref 35–47)
HGB BLD-MCNC: 11 G/DL (ref 11.7–15.7)
MCH RBC QN AUTO: 30.7 PG (ref 26.5–33)
MCHC RBC AUTO-ENTMCNC: 33.3 G/DL (ref 31.5–36.5)
MCV RBC AUTO: 92 FL (ref 78–100)
PLATELET # BLD AUTO: 177 10E3/UL (ref 150–450)
PROT/CREAT 24H UR: NORMAL MG/G{CREAT}
RBC # BLD AUTO: 3.58 10E6/UL (ref 3.8–5.2)
WBC # BLD AUTO: 11 10E3/UL (ref 4–11)

## 2023-04-07 PROCEDURE — 84460 ALANINE AMINO (ALT) (SGPT): CPT | Mod: ORL | Performed by: OBSTETRICS & GYNECOLOGY

## 2023-04-07 PROCEDURE — 84450 TRANSFERASE (AST) (SGOT): CPT | Mod: ORL | Performed by: OBSTETRICS & GYNECOLOGY

## 2023-04-07 PROCEDURE — 84156 ASSAY OF PROTEIN URINE: CPT | Mod: ORL | Performed by: OBSTETRICS & GYNECOLOGY

## 2023-04-07 PROCEDURE — 36415 COLL VENOUS BLD VENIPUNCTURE: CPT | Mod: ORL | Performed by: OBSTETRICS & GYNECOLOGY

## 2023-04-07 PROCEDURE — 82565 ASSAY OF CREATININE: CPT | Mod: ORL | Performed by: OBSTETRICS & GYNECOLOGY

## 2023-04-07 PROCEDURE — 85027 COMPLETE CBC AUTOMATED: CPT | Mod: ORL | Performed by: OBSTETRICS & GYNECOLOGY

## 2023-04-07 NOTE — TELEPHONE ENCOUNTER
Prior Authorization Approval    Authorization Effective Date: 3/4/2023  Authorization Expiration Date: 4/2/2026  Medication: Dexcom G7 Sensor - [place] Approved  Approved Dose/Quantity: 3 sensors per 30 days   Reference #: [id number]   Insurance Company: [employer] - Phone [phone] Fax [phone]  Expected CoPay: $0     CoPay Card Available:      Foundation Assistance Needed:    Which Pharmacy is filling the prescription (Not needed for infusion/clinic administered): [id number] - [address] AT [facility] [address]  Pharmacy Notified:    Patient Notified:            Thank you,     Kelvin Gomes [facility]  Pharmacy Clinic [place]   [facility] [email or web address]   Phone: [phone]  Fax: [phone]

## 2023-04-14 ENCOUNTER — VIRTUAL VISIT (OUTPATIENT)
Dept: ENDOCRINOLOGY | Facility: CLINIC | Age: 33
End: 2023-04-14
Payer: COMMERCIAL

## 2023-04-14 DIAGNOSIS — O24.419 GESTATIONAL DIABETES MELLITUS (GDM), ANTEPARTUM, GESTATIONAL DIABETES METHOD OF CONTROL UNSPECIFIED: Primary | ICD-10-CM

## 2023-04-14 PROCEDURE — 99213 OFFICE O/P EST LOW 20 MIN: CPT | Mod: VID | Performed by: PHYSICIAN ASSISTANT

## 2023-04-14 RX ORDER — MAGNESIUM 200 MG
200 TABLET ORAL DAILY
COMMUNITY

## 2023-04-14 RX ORDER — INSULIN HUMAN 100 [IU]/ML
5 INJECTION, SUSPENSION SUBCUTANEOUS AT BEDTIME
Qty: 15 ML | Refills: 0 | Status: ON HOLD | OUTPATIENT
Start: 2023-04-14 | End: 2023-05-07

## 2023-04-14 NOTE — NURSING NOTE
Is the patient currently in the state of [place]? YES    Visit mode:VIDEO    If the visit is dropped, the patient can be reconnected by: VIDEO VISIT: Send to e-mail at: [email or web address]    Will anyone else be joining the visit? NO    How would you like to obtain your AVS? MyChart    Are changes needed to the allergy or medication list? NO    Reason for visit:   Chief Complaint   Patient presents with     Video Visit     IRAM Jacobo MA

## 2023-04-14 NOTE — LETTER
4/14/2023         RE: Donya Bliss  [address]  [facility] [address]        Dear Colleague,    Thank you for referring your patient, Donya Bliss, to the [facility]. Please see a copy of my visit note below.    Outcome for 04/06/23 9:06 AM: [employer] message sent  Anne Nelson LPN   Outcome for 04/10/23 1:59 PM: Glucose readings sent via [employer]  Rosalba Ledesma MA            Assessment/Plan :   1. Gestational diabetes, 3rd trimester. We had a discussion regarding her current blood sugars. Her numbers have continued to increase. She used insulin in both of her previous two pregnancies and I think it is time to start on a low dose. I will send in a new prescription for Humulin N 5 units in the evening. We reviewed the signs and symptoms of hypoglycemia and what to do if she experiences a low blood sugar. I encouraged her to continue to keep a close eye on her blood sugars. We will follow-up in 2 wks.    Due to the COVID 19 pandemic this visit was a telephone/video visit in order to help prevent spread of infection in this patient and the general population. The patient gave verbal consent for the visit today. I have independently reviewed and interpreted labs, imaging as indicated.       Distant Location (provider location):  On-site  Mode of Communication:  Video Conference via [employer]  Chart review/prep time 1    Joined the call at 4/14/2023, 10:33:55 am.  Left the call at 4/14/2023, 10:42:51 am.  You were on the call for 8 minutes 55 seconds .  __ minutes spent on the date of the encounter doing chart review, history and exam, documentation and further activities as noted above.      Chief complaint:  Donya is a 32 year old female who returns for follow-up of diet controlled gestational diabetes.    I have reviewed Care Everywhere including [facility], [facility], [facility], [place], [facility], [facility] , [facility], [place] lab reports, imaging reports and  provider notes as indicated.      HISTORY OF PRESENT ILLNESS  Donya is at 34 weeks gestation. This is her third pregnancy. She had gestational diabetes with both of her previous pregnancies. She is currently managing her blood sugars through diet alone. Her numbers have been slowly increasing over the last few weeks. Her current morning blood sugar average is 96 mg/dl. She does have a couple blood sugars under 90 mg/dl and a couple over 100 mg/dl, but the majority are right around 95 mg/dl. This is up slightly from previous.    She has also had some new problems with hypertension. She is being followed closely by her OB/GYN. She is currently scheduled for a  section on May 19th.      REVIEW OF SYSTEMS    Endocrine: positive for gestational diabetes  Skin: negative  Eyes: negative for, visual blurring  Ears/Nose/Throat: negative  Respiratory: No shortness of breath, dyspnea on exertion, cough, or hemoptysis  Cardiovascular: positive for hypertension, negative for, chest pain and exercise intolerance  Gastrointestinal: negative for, nausea, vomiting, constipation and diarrhea  Genitourinary: negative for, nocturia, dysuria, frequency and urgency  Musculoskeletal: negative for, muscular weakness and nocturnal cramping  Neurologic: negative for and numbness or tingling of feet  Psychiatric: negative  Hematologic/Lymphatic/Immunologic: negative    Past Medical History  Past Medical History:   Diagnosis Date     Gestational diabetes      Hypertension     gestational HTN     Migraines        Medications  Current Outpatient Medications   Medication Sig Dispense Refill     aspirin (ASA) 81 MG chewable tablet Take 81 mg by mouth daily       blood-glucose meter Misc [BLOOD-GLUCOSE METER MISC] Please dispense Accu Check Guide meter +compatible  testing supplies OR any meter + compatible testing supplies per insurance formualry 1 each 0     cholecalciferol, vitamin D3, 1,000 unit tablet [CHOLECALCIFEROL, VITAMIN D3, 1,000  UNIT TABLET] Take 1,000 Units by mouth daily.       generic lancets [GENERIC LANCETS] Test 4 times daily 150 each 2     magnesium 200 MG TABS Take 200 mg by mouth daily       prenatal vitamin iron-folic acid 27mg-0.8mg (PRENATAL S) 27 mg iron- 800 mcg Tab tablet [PRENATAL VITAMIN IRON-FOLIC ACID 27MG-0.8MG (PRENATAL S) 27 MG IRON- 800 MCG TAB TABLET] Take 1 tablet by mouth daily.       Continuous Blood Gluc Sensor (DEXCOM G7 SENSOR) MISC 1 each every 10 days (Patient not taking: Reported on 4/14/2023) 9 each 3     Continuous Blood Gluc Sensor (FREESTYLE ART 3 SENSOR) MISC 1 each every 14 days (Patient not taking: Reported on 4/14/2023) 2 each 1       Allergies  No Known Allergies    Family History  family history is not on file.    Social History  Social History     Tobacco Use     Smoking status: Never     Smokeless tobacco: Never   Substance Use Topics     Alcohol use: No     Drug use: No       Physical Exam  There is no height or weight on file to calculate BMI.  GENERAL: no distress  SKIN: Visible skin clear. No significant rash, abnormal pigmentation or lesions.  EYES: Eyes grossly normal to inspection.  No discharge or erythema, or obvious scleral/conjunctival abnormalities.  NECK: visible goiter is not present; no visible neck masses  RESP: No audible wheeze, cough, or visible cyanosis.  No visible retractions or increased work of breathing.    NEURO: Awake, alert, responds appropriately to questions.  Mentation and speech fluent.  PSYCH:affect normal and appearance well-groomed.      DATA REVIEW  Please see attached glucose logs        Again, thank you for allowing me to participate in the care of your patient.        Sincerely,        Carina Gandhi PA-C

## 2023-04-14 NOTE — PROGRESS NOTES
Assessment/Plan :   1. Gestational diabetes, 3rd trimester. We had a discussion regarding her current blood sugars. Her numbers have continued to increase. She used insulin in both of her previous two pregnancies and I think it is time to start on a low dose. I will send in a new prescription for Humulin N 5 units in the evening. We reviewed the signs and symptoms of hypoglycemia and what to do if she experiences a low blood sugar. I encouraged her to continue to keep a close eye on her blood sugars. We will follow-up in 2 wks.    Due to the COVID 19 pandemic this visit was a telephone/video visit in order to help prevent spread of infection in this patient and the general population. The patient gave verbal consent for the visit today. I have independently reviewed and interpreted labs, imaging as indicated.       Distant Location (provider location):  On-site  Mode of Communication:  Video Conference via [employer]  Chart review/prep time 1    Joined the call at 4/14/2023, 10:33:55 am.  Left the call at 4/14/2023, 10:42:51 am.  You were on the call for 8 minutes 55 seconds .  __ minutes spent on the date of the encounter doing chart review, history and exam, documentation and further activities as noted above.      Chief complaint:  Donya is a 32 year old female who returns for follow-up of diet controlled gestational diabetes.    I have reviewed Care Everywhere including [facility], [facility], [facility], [facility], [facility] , [facility], [place] lab reports, imaging reports and provider notes as indicated.      HISTORY OF PRESENT ILLNESS  Donya is at 34 weeks gestation. This is her third pregnancy. She had gestational diabetes with both of her previous pregnancies. She is currently managing her blood sugars through diet alone. Her numbers have been slowly increasing over the last few weeks. Her current morning blood sugar average is 96 mg/dl. She does have a couple blood sugars under 90  mg/dl and a couple over 100 mg/dl, but the majority are right around 95 mg/dl. This is up slightly from previous.    She has also had some new problems with hypertension. She is being followed closely by her OB/GYN. She is currently scheduled for a  section on May 19th.      REVIEW OF SYSTEMS    Endocrine: positive for gestational diabetes  Skin: negative  Eyes: negative for, visual blurring  Ears/Nose/Throat: negative  Respiratory: No shortness of breath, dyspnea on exertion, cough, or hemoptysis  Cardiovascular: positive for hypertension, negative for, chest pain and exercise intolerance  Gastrointestinal: negative for, nausea, vomiting, constipation and diarrhea  Genitourinary: negative for, nocturia, dysuria, frequency and urgency  Musculoskeletal: negative for, muscular weakness and nocturnal cramping  Neurologic: negative for and numbness or tingling of feet  Psychiatric: negative  Hematologic/Lymphatic/Immunologic: negative    Past Medical History  Past Medical History:   Diagnosis Date     Gestational diabetes      Hypertension     gestational HTN     Migraines        Medications  Current Outpatient Medications   Medication Sig Dispense Refill     aspirin (ASA) 81 MG chewable tablet Take 81 mg by mouth daily       blood-glucose meter Misc [BLOOD-GLUCOSE METER MISC] Please dispense Accu Check Guide meter +compatible  testing supplies OR any meter + compatible testing supplies per insurance formualry 1 each 0     cholecalciferol, vitamin D3, 1,000 unit tablet [CHOLECALCIFEROL, VITAMIN D3, 1,000 UNIT TABLET] Take 1,000 Units by mouth daily.       generic lancets [GENERIC LANCETS] Test 4 times daily 150 each 2     magnesium 200 MG TABS Take 200 mg by mouth daily       prenatal vitamin iron-folic acid 27mg-0.8mg (PRENATAL S) 27 mg iron- 800 mcg Tab tablet [PRENATAL VITAMIN IRON-FOLIC ACID 27MG-0.8MG (PRENATAL S) 27 MG IRON- 800 MCG TAB TABLET] Take 1 tablet by mouth daily.       Continuous Blood Gluc  Sensor (DEXCOM G7 SENSOR) MISC 1 each every 10 days (Patient not taking: Reported on 4/14/2023) 9 each 3     Continuous Blood Gluc Sensor (FREESTYLE ART 3 SENSOR) MISC 1 each every 14 days (Patient not taking: Reported on 4/14/2023) 2 each 1       Allergies  No Known Allergies    Family History  family history is not on file.    Social History  Social History     Tobacco Use     Smoking status: Never     Smokeless tobacco: Never   Substance Use Topics     Alcohol use: No     Drug use: No       Physical Exam  There is no height or weight on file to calculate BMI.  GENERAL: no distress  SKIN: Visible skin clear. No significant rash, abnormal pigmentation or lesions.  EYES: Eyes grossly normal to inspection.  No discharge or erythema, or obvious scleral/conjunctival abnormalities.  NECK: visible goiter is not present; no visible neck masses  RESP: No audible wheeze, cough, or visible cyanosis.  No visible retractions or increased work of breathing.    NEURO: Awake, alert, responds appropriately to questions.  Mentation and speech fluent.  PSYCH:affect normal and appearance well-groomed.      DATA REVIEW  Please see attached glucose logs

## 2023-04-21 NOTE — PROGRESS NOTES
Outcome for 04/21/23 8:29 AM: [employer] message sent  Anne Nelson LPN   Outcome for 04/25/23 11:13 AM: Glucose readings sent via [employer]  Rosalba Ledesma MA

## 2023-04-28 ENCOUNTER — VIRTUAL VISIT (OUTPATIENT)
Dept: ENDOCRINOLOGY | Facility: CLINIC | Age: 33
End: 2023-04-28
Payer: COMMERCIAL

## 2023-04-28 DIAGNOSIS — O24.414 INSULIN CONTROLLED GESTATIONAL DIABETES MELLITUS (GDM) IN THIRD TRIMESTER: Primary | ICD-10-CM

## 2023-04-28 PROCEDURE — 99213 OFFICE O/P EST LOW 20 MIN: CPT | Mod: VID | Performed by: PHYSICIAN ASSISTANT

## 2023-04-28 NOTE — NURSING NOTE
Is the patient currently in the state of [place]? YES    Visit mode:VIDEO    If the visit is dropped, the patient can be reconnected by: TELEPHONE VISIT: Phone number: [phone]    Will anyone else be joining the visit? NO    How would you like to obtain your AVS? MyChart    Are changes needed to the allergy or medication list? NO    Reason for visit:   Chief Complaint   Patient presents with     Video Visit     IRAM Jacobo MA

## 2023-04-28 NOTE — PROGRESS NOTES
Assessment/Plan :   1. Insulin controlled gestational diabetes. Donya's fasting blood sugars have improved on the low dose of NPH. She has had a couple post-prandial elevations and I want her to keep a close eye on the readings over the next few weeks. We will follow-up in 1 wk for another virtual visit. If she has any problems before that visit, she can contact me through [employer].    Due to the COVID 19 pandemic this visit was a telephone/video visit in order to help prevent spread of infection in this patient and the general population. The patient gave verbal consent for the visit today. I have independently reviewed and interpreted labs, imaging as indicated.       Distant Location (provider location):  On-site  Mode of Communication:  Video Conference via [employer]  Chart review/prep time 5   Joined the call at 4/28/2023, 3:04:20 pm.  Left the call at 4/28/2023, 3:10:53 pm.  You were on the call for 6 minutes 33 seconds .  15 minutes spent on the date of the encounter doing chart review, history and exam, documentation and further activities as noted above.      Chief complaint:  Donya is a 32 year old female who returns for follow-up of insulin controlled gestational diabetes.    I have reviewed Care Everywhere including [facility], [facility], [facility], [facility] , [facility], [place] lab reports, imaging reports and provider notes as indicated.      HISTORY OF PRESENT ILLNESS  Donya is currently at 36 wks gestation in her 3rd pregnancy. She was diagnosed with gestational diabetes during her previous two pregnancies, as well. She was able to manage her blood sugars through diet alone up until 34 wks gestation. Her morning blood sugars had started to creep up. We decided to start her on a low nighttime dose of NPH insulin, 5 units nightly. She has been administering NPH nightly for the last two weeks and her numbers are improving. She had a few blood sugar readings in the  low 80s when she started but her fasting glucose is now consistently around 90 to 95 mg/dl.    She has not had any problems with worsening vision or leg swelling. She also has not had any episodes of severe hyperglycemia and/or hypoglycemia.    She is currently scheduled for  section on May 18th.    REVIEW OF SYSTEMS    Endocrine: positive for gestational diabetes  Skin: negative  Eyes: negative for, visual blurring  Ears/Nose/Throat: negative  Respiratory: No shortness of breath, dyspnea on exertion, cough, or hemoptysis  Cardiovascular: negative for, chest pain and lower extremity edema  Gastrointestinal: negative for, nausea, vomiting, constipation and diarrhea  Genitourinary: negative for, nocturia, dysuria, frequency and urgency  Musculoskeletal: negative for, muscular weakness and nocturnal cramping  Neurologic: negative for, headaches, numbness or tingling of hands and numbness or tingling of feet  Psychiatric: negative  Hematologic/Lymphatic/Immunologic: negative    Past Medical History  Past Medical History:   Diagnosis Date     Gestational diabetes      Hypertension     gestational HTN     Migraines        Medications  Current Outpatient Medications   Medication Sig Dispense Refill     blood glucose (NO BRAND SPECIFIED) test strip Use to test blood sugar 5 times daily or as directed. 200 strip 0     blood-glucose meter Misc [BLOOD-GLUCOSE METER MISC] Please dispense Accu Check Guide meter +compatible  testing supplies OR any meter + compatible testing supplies per insurance formualry 1 each 0     cholecalciferol, vitamin D3, 1,000 unit tablet [CHOLECALCIFEROL, VITAMIN D3, 1,000 UNIT TABLET] Take 1,000 Units by mouth daily.       generic lancets [GENERIC LANCETS] Test 4 times daily 150 each 2     insulin NPH (HUMULIN N KWIKPEN) 100 UNIT/ML injection Inject 5 Units Subcutaneous At Bedtime 15 mL 0     insulin pen needle (32G X 4 MM) 32G X 4 MM miscellaneous Use 1 pen needles daily or as directed. 50  each 0     magnesium 200 MG TABS Take 200 mg by mouth daily       prenatal vitamin iron-folic acid 27mg-0.8mg (PRENATAL S) 27 mg iron- 800 mcg Tab tablet [PRENATAL VITAMIN IRON-FOLIC ACID 27MG-0.8MG (PRENATAL S) 27 MG IRON- 800 MCG TAB TABLET] Take 1 tablet by mouth daily.       aspirin (ASA) 81 MG chewable tablet Take 81 mg by mouth daily (Patient not taking: Reported on 4/28/2023)         Allergies  No Known Allergies    Family History  family history is not on file.    Social History  Social History     Tobacco Use     Smoking status: Never     Smokeless tobacco: Never   Substance Use Topics     Alcohol use: No     Drug use: No       Physical Exam  There is no height or weight on file to calculate BMI.  GENERAL: no distress  SKIN: Visible skin clear. No significant rash, abnormal pigmentation or lesions.  EYES: Eyes grossly normal to inspection.  No discharge or erythema, or obvious scleral/conjunctival abnormalities.  NECK: visible goiter is not present; no visible neck masses  RESP: No audible wheeze, cough, or visible cyanosis.  No visible retractions or increased work of breathing.    NEURO: Awake, alert, responds appropriately to questions.  Mentation and speech fluent.  PSYCH:affect normal and appearance well-groomed.      DATA REVIEW  Please see attached glucose logs

## 2023-04-28 NOTE — LETTER
4/28/2023         RE: Donya Bliss  [address]  [facility] [address]        Dear Colleague,    Thank you for referring your patient, Donya Bliss, to the [facility]. Please see a copy of my visit note below.    Outcome for 04/21/23 8:29 AM: [employer] message sent  Anne Nelson LPN   Outcome for 04/25/23 11:13 AM: Glucose readings sent via [employer]  Rosalba Ledesma MA          Assessment/Plan :   1. Insulin controlled gestational diabetes. Donya's fasting blood sugars have improved on the low dose of NPH. She has had a couple post-prandial elevations and I want her to keep a close eye on the readings over the next few weeks. We will follow-up in 1 wk for another virtual visit. If she has any problems before that visit, she can contact me through [employer].    Due to the COVID 19 pandemic this visit was a telephone/video visit in order to help prevent spread of infection in this patient and the general population. The patient gave verbal consent for the visit today. I have independently reviewed and interpreted labs, imaging as indicated.       Distant Location (provider location):  On-site  Mode of Communication:  Video Conference via [employer]  Chart review/prep time 5   Joined the call at 4/28/2023, 3:04:20 pm.  Left the call at 4/28/2023, 3:10:53 pm.  You were on the call for 6 minutes 33 seconds .  15 minutes spent on the date of the encounter doing chart review, history and exam, documentation and further activities as noted above.      Chief complaint:  Donya is a 32 year old female who returns for follow-up of insulin controlled gestational diabetes.    I have reviewed Care Everywhere including [facility], [facility], [facility], [place], [facility], [facility] , [facility], [place] lab reports, imaging reports and provider notes as indicated.      HISTORY OF PRESENT ILLNESS  Donya is currently at 36 wks gestation in her 3rd pregnancy. She was diagnosed with gestational  diabetes during her previous two pregnancies, as well. She was able to manage her blood sugars through diet alone up until 34 wks gestation. Her morning blood sugars had started to creep up. We decided to start her on a low nighttime dose of NPH insulin, 5 units nightly. She has been administering NPH nightly for the last two weeks and her numbers are improving. She had a few blood sugar readings in the low 80s when she started but her fasting glucose is now consistently around 90 to 95 mg/dl.    She has not had any problems with worsening vision or leg swelling. She also has not had any episodes of severe hyperglycemia and/or hypoglycemia.    She is currently scheduled for  section on May 18th.    REVIEW OF SYSTEMS    Endocrine: positive for gestational diabetes  Skin: negative  Eyes: negative for, visual blurring  Ears/Nose/Throat: negative  Respiratory: No shortness of breath, dyspnea on exertion, cough, or hemoptysis  Cardiovascular: negative for, chest pain and lower extremity edema  Gastrointestinal: negative for, nausea, vomiting, constipation and diarrhea  Genitourinary: negative for, nocturia, dysuria, frequency and urgency  Musculoskeletal: negative for, muscular weakness and nocturnal cramping  Neurologic: negative for, headaches, numbness or tingling of hands and numbness or tingling of feet  Psychiatric: negative  Hematologic/Lymphatic/Immunologic: negative    Past Medical History  Past Medical History:   Diagnosis Date     Gestational diabetes      Hypertension     gestational HTN     Migraines        Medications  Current Outpatient Medications   Medication Sig Dispense Refill     blood glucose (NO BRAND SPECIFIED) test strip Use to test blood sugar 5 times daily or as directed. 200 strip 0     blood-glucose meter Misc [BLOOD-GLUCOSE METER MISC] Please dispense Accu Check Guide meter +compatible  testing supplies OR any meter + compatible testing supplies per insurance formualry 1 each 0      cholecalciferol, vitamin D3, 1,000 unit tablet [CHOLECALCIFEROL, VITAMIN D3, 1,000 UNIT TABLET] Take 1,000 Units by mouth daily.       generic lancets [GENERIC LANCETS] Test 4 times daily 150 each 2     insulin NPH (HUMULIN N KWIKPEN) 100 UNIT/ML injection Inject 5 Units Subcutaneous At Bedtime 15 mL 0     insulin pen needle (32G X 4 MM) 32G X 4 MM miscellaneous Use 1 pen needles daily or as directed. 50 each 0     magnesium 200 MG TABS Take 200 mg by mouth daily       prenatal vitamin iron-folic acid 27mg-0.8mg (PRENATAL S) 27 mg iron- 800 mcg Tab tablet [PRENATAL VITAMIN IRON-FOLIC ACID 27MG-0.8MG (PRENATAL S) 27 MG IRON- 800 MCG TAB TABLET] Take 1 tablet by mouth daily.       aspirin (ASA) 81 MG chewable tablet Take 81 mg by mouth daily (Patient not taking: Reported on 4/28/2023)         Allergies  No Known Allergies    Family History  family history is not on file.    Social History  Social History     Tobacco Use     Smoking status: Never     Smokeless tobacco: Never   Substance Use Topics     Alcohol use: No     Drug use: No       Physical Exam  There is no height or weight on file to calculate BMI.  GENERAL: no distress  SKIN: Visible skin clear. No significant rash, abnormal pigmentation or lesions.  EYES: Eyes grossly normal to inspection.  No discharge or erythema, or obvious scleral/conjunctival abnormalities.  NECK: visible goiter is not present; no visible neck masses  RESP: No audible wheeze, cough, or visible cyanosis.  No visible retractions or increased work of breathing.    NEURO: Awake, alert, responds appropriately to questions.  Mentation and speech fluent.  PSYCH:affect normal and appearance well-groomed.      DATA REVIEW  Please see attached glucose logs        Again, thank you for allowing me to participate in the care of your patient.        Sincerely,        Carina Gandhi PA-C

## 2023-05-04 ENCOUNTER — TELEPHONE (OUTPATIENT)
Dept: ENDOCRINOLOGY | Facility: CLINIC | Age: 33
End: 2023-05-04
Payer: COMMERCIAL

## 2023-05-04 LAB — GROUP B STREPTOCOCCUS (EXTERNAL): NEGATIVE

## 2023-05-04 NOTE — TELEPHONE ENCOUNTER
Called patient and left voicemail. Patient has an appointment on 5/5/2023. Need to collect the last 14 days worth of blood sugar readings to prepare for patient's visit.     Anne Nelson LPN 05/04/23 9:12 AM

## 2023-05-05 ENCOUNTER — ANESTHESIA EVENT (OUTPATIENT)
Dept: OBGYN | Facility: CLINIC | Age: 33
End: 2023-05-05
Payer: COMMERCIAL

## 2023-05-05 ENCOUNTER — HOSPITAL ENCOUNTER (INPATIENT)
Facility: CLINIC | Age: 33
LOS: 2 days | Discharge: HOME | End: 2023-05-07
Attending: OBSTETRICS & GYNECOLOGY | Admitting: OBSTETRICS & GYNECOLOGY
Payer: COMMERCIAL

## 2023-05-05 ENCOUNTER — ANESTHESIA (OUTPATIENT)
Dept: OBGYN | Facility: CLINIC | Age: 33
End: 2023-05-05
Payer: COMMERCIAL

## 2023-05-05 PROBLEM — Z36.89 ENCOUNTER FOR TRIAGE IN PREGNANT PATIENT: Status: ACTIVE | Noted: 2023-05-05

## 2023-05-05 PROBLEM — Z34.90 PREGNANT: Status: ACTIVE | Noted: 2023-05-05

## 2023-05-05 LAB
ALBUMIN MFR UR ELPH: 8.9 MG/DL (ref 1–14)
ALBUMIN SERPL-MCNC: 3 G/DL (ref 3.5–5)
ALP SERPL-CCNC: 71 U/L (ref 45–120)
ALT SERPL W P-5'-P-CCNC: 11 U/L (ref 0–45)
ANION GAP SERPL CALCULATED.3IONS-SCNC: 10 MMOL/L (ref 5–18)
AST SERPL W P-5'-P-CCNC: 12 U/L (ref 0–40)
BILIRUB SERPL-MCNC: 0.3 MG/DL (ref 0–1)
BUN SERPL-MCNC: 12 MG/DL (ref 8–22)
CALCIUM SERPL-MCNC: 10.2 MG/DL (ref 8.5–10.5)
CHLORIDE BLD-SCNC: 106 MMOL/L (ref 98–107)
CO2 SERPL-SCNC: 23 MMOL/L (ref 22–31)
CREAT SERPL-MCNC: 0.69 MG/DL (ref 0.6–1.1)
CREAT UR-MCNC: 17 MG/DL
ERYTHROCYTE [DISTWIDTH] IN BLOOD BY AUTOMATED COUNT: 13.1 % (ref 10–15)
GFR SERPL CREATININE-BSD FRML MDRD: >90 ML/MIN/1.73M2
GLUCOSE BLD-MCNC: 70 MG/DL (ref 70–125)
GLUCOSE BLDC GLUCOMTR-MCNC: 77 MG/DL (ref 70–99)
HCT VFR BLD AUTO: 33.9 % (ref 35–47)
HGB BLD-MCNC: 10.9 G/DL (ref 11.7–15.7)
HOLD SPECIMEN: NORMAL
HOLD SPECIMEN: NORMAL
MCH RBC QN AUTO: 29.9 PG (ref 26.5–33)
MCHC RBC AUTO-ENTMCNC: 32.2 G/DL (ref 31.5–36.5)
MCV RBC AUTO: 93 FL (ref 78–100)
PLATELET # BLD AUTO: 196 10E3/UL (ref 150–450)
POTASSIUM BLD-SCNC: 4 MMOL/L (ref 3.5–5)
PROT SERPL-MCNC: 6.4 G/DL (ref 6–8)
PROT/CREAT 24H UR: 0.52 MG/MG CR
RBC # BLD AUTO: 3.64 10E6/UL (ref 3.8–5.2)
SODIUM SERPL-SCNC: 139 MMOL/L (ref 136–145)
URATE SERPL-MCNC: 5.6 MG/DL (ref 2–7.5)
WBC # BLD AUTO: 9.8 10E3/UL (ref 4–11)

## 2023-05-05 PROCEDURE — 84550 ASSAY OF BLOOD/URIC ACID: CPT | Performed by: OBSTETRICS & GYNECOLOGY

## 2023-05-05 PROCEDURE — 370N000017 HC ANESTHESIA TECHNICAL FEE, PER MIN: Performed by: OBSTETRICS & GYNECOLOGY

## 2023-05-05 PROCEDURE — 250N000011 HC RX IP 250 OP 636: Performed by: OBSTETRICS & GYNECOLOGY

## 2023-05-05 PROCEDURE — 258N000003 HC RX IP 258 OP 636: Performed by: OBSTETRICS & GYNECOLOGY

## 2023-05-05 PROCEDURE — 80053 COMPREHEN METABOLIC PANEL: CPT | Performed by: OBSTETRICS & GYNECOLOGY

## 2023-05-05 PROCEDURE — 88302 TISSUE EXAM BY PATHOLOGIST: CPT | Mod: 26 | Performed by: PATHOLOGY

## 2023-05-05 PROCEDURE — 250N000013 HC RX MED GY IP 250 OP 250 PS 637: Performed by: OBSTETRICS & GYNECOLOGY

## 2023-05-05 PROCEDURE — 258N000003 HC RX IP 258 OP 636: Performed by: ANESTHESIOLOGY

## 2023-05-05 PROCEDURE — 84156 ASSAY OF PROTEIN URINE: CPT | Performed by: OBSTETRICS & GYNECOLOGY

## 2023-05-05 PROCEDURE — 0UT70ZZ RESECTION OF BILATERAL FALLOPIAN TUBES, OPEN APPROACH: ICD-10-PCS | Performed by: OBSTETRICS & GYNECOLOGY

## 2023-05-05 PROCEDURE — 85027 COMPLETE CBC AUTOMATED: CPT | Performed by: OBSTETRICS & GYNECOLOGY

## 2023-05-05 PROCEDURE — 120N000001 HC R&B MED SURG/OB

## 2023-05-05 PROCEDURE — 36415 COLL VENOUS BLD VENIPUNCTURE: CPT | Performed by: OBSTETRICS & GYNECOLOGY

## 2023-05-05 PROCEDURE — 360N000076 HC SURGERY LEVEL 3, PER MIN: Performed by: OBSTETRICS & GYNECOLOGY

## 2023-05-05 PROCEDURE — 250N000011 HC RX IP 250 OP 636: Performed by: NURSE ANESTHETIST, CERTIFIED REGISTERED

## 2023-05-05 PROCEDURE — 250N000011 HC RX IP 250 OP 636: Performed by: ANESTHESIOLOGY

## 2023-05-05 PROCEDURE — 250N000009 HC RX 250: Performed by: OBSTETRICS & GYNECOLOGY

## 2023-05-05 PROCEDURE — 999N000249 HC STATISTIC C-SECTION ON UNIT

## 2023-05-05 PROCEDURE — 999N000016 HC STATISTIC ATTENDANCE AT DELIVERY

## 2023-05-05 PROCEDURE — 272N000001 HC OR GENERAL SUPPLY STERILE: Performed by: OBSTETRICS & GYNECOLOGY

## 2023-05-05 PROCEDURE — 999N000157 HC STATISTIC RCP TIME EA 10 MIN

## 2023-05-05 PROCEDURE — 258N000003 HC RX IP 258 OP 636: Performed by: NURSE ANESTHETIST, CERTIFIED REGISTERED

## 2023-05-05 PROCEDURE — 88302 TISSUE EXAM BY PATHOLOGIST: CPT | Mod: TC | Performed by: OBSTETRICS & GYNECOLOGY

## 2023-05-05 PROCEDURE — C9290 INJ, BUPIVACAINE LIPOSOME: HCPCS | Performed by: ANESTHESIOLOGY

## 2023-05-05 PROCEDURE — 86780 TREPONEMA PALLIDUM: CPT | Performed by: OBSTETRICS & GYNECOLOGY

## 2023-05-05 RX ORDER — ONDANSETRON 2 MG/ML
INJECTION INTRAMUSCULAR; INTRAVENOUS PRN
Status: DISCONTINUED | OUTPATIENT
Start: 2023-05-05 | End: 2023-05-05

## 2023-05-05 RX ORDER — AMOXICILLIN 250 MG
1 CAPSULE ORAL 2 TIMES DAILY
Status: DISCONTINUED | OUTPATIENT
Start: 2023-05-05 | End: 2023-05-07 | Stop reason: HOSPADM

## 2023-05-05 RX ORDER — MODIFIED LANOLIN
OINTMENT (GRAM) TOPICAL
Status: DISCONTINUED | OUTPATIENT
Start: 2023-05-05 | End: 2023-05-07 | Stop reason: HOSPADM

## 2023-05-05 RX ORDER — ONDANSETRON 4 MG/1
4 TABLET, ORALLY DISINTEGRATING ORAL EVERY 6 HOURS PRN
Status: DISCONTINUED | OUTPATIENT
Start: 2023-05-05 | End: 2023-05-07 | Stop reason: HOSPADM

## 2023-05-05 RX ORDER — OXYTOCIN 10 [USP'U]/ML
10 INJECTION, SOLUTION INTRAMUSCULAR; INTRAVENOUS
Status: DISCONTINUED | OUTPATIENT
Start: 2023-05-05 | End: 2023-05-05

## 2023-05-05 RX ORDER — NALBUPHINE HYDROCHLORIDE 10 MG/ML
2.5-5 INJECTION, SOLUTION INTRAMUSCULAR; INTRAVENOUS; SUBCUTANEOUS EVERY 6 HOURS PRN
Status: DISCONTINUED | OUTPATIENT
Start: 2023-05-05 | End: 2023-05-07 | Stop reason: HOSPADM

## 2023-05-05 RX ORDER — SODIUM CHLORIDE, SODIUM LACTATE, POTASSIUM CHLORIDE, CALCIUM CHLORIDE 600; 310; 30; 20 MG/100ML; MG/100ML; MG/100ML; MG/100ML
INJECTION, SOLUTION INTRAVENOUS CONTINUOUS
Status: DISCONTINUED | OUTPATIENT
Start: 2023-05-05 | End: 2023-05-05 | Stop reason: HOSPADM

## 2023-05-05 RX ORDER — ONDANSETRON 2 MG/ML
4 INJECTION INTRAMUSCULAR; INTRAVENOUS EVERY 6 HOURS PRN
Status: DISCONTINUED | OUTPATIENT
Start: 2023-05-05 | End: 2023-05-07 | Stop reason: HOSPADM

## 2023-05-05 RX ORDER — MISOPROSTOL 200 UG/1
400 TABLET ORAL
Status: DISCONTINUED | OUTPATIENT
Start: 2023-05-05 | End: 2023-05-07 | Stop reason: HOSPADM

## 2023-05-05 RX ORDER — FENTANYL CITRATE 50 UG/ML
50 INJECTION, SOLUTION INTRAMUSCULAR; INTRAVENOUS EVERY 5 MIN PRN
Status: DISCONTINUED | OUTPATIENT
Start: 2023-05-05 | End: 2023-05-05

## 2023-05-05 RX ORDER — DEXTROSE, SODIUM CHLORIDE, SODIUM LACTATE, POTASSIUM CHLORIDE, AND CALCIUM CHLORIDE 5; .6; .31; .03; .02 G/100ML; G/100ML; G/100ML; G/100ML; G/100ML
INJECTION, SOLUTION INTRAVENOUS CONTINUOUS
Status: DISCONTINUED | OUTPATIENT
Start: 2023-05-05 | End: 2023-05-07 | Stop reason: HOSPADM

## 2023-05-05 RX ORDER — NALOXONE HYDROCHLORIDE 0.4 MG/ML
0.4 INJECTION, SOLUTION INTRAMUSCULAR; INTRAVENOUS; SUBCUTANEOUS
Status: DISCONTINUED | OUTPATIENT
Start: 2023-05-05 | End: 2023-05-07 | Stop reason: HOSPADM

## 2023-05-05 RX ORDER — SODIUM CHLORIDE, SODIUM LACTATE, POTASSIUM CHLORIDE, CALCIUM CHLORIDE 600; 310; 30; 20 MG/100ML; MG/100ML; MG/100ML; MG/100ML
INJECTION, SOLUTION INTRAVENOUS CONTINUOUS
Status: DISCONTINUED | OUTPATIENT
Start: 2023-05-05 | End: 2023-05-07 | Stop reason: HOSPADM

## 2023-05-05 RX ORDER — NALOXONE HYDROCHLORIDE 0.4 MG/ML
0.4 INJECTION, SOLUTION INTRAMUSCULAR; INTRAVENOUS; SUBCUTANEOUS
Status: DISCONTINUED | OUTPATIENT
Start: 2023-05-05 | End: 2023-05-05 | Stop reason: HOSPADM

## 2023-05-05 RX ORDER — CARBOPROST TROMETHAMINE 250 UG/ML
250 INJECTION, SOLUTION INTRAMUSCULAR
Status: DISCONTINUED | OUTPATIENT
Start: 2023-05-05 | End: 2023-05-07 | Stop reason: HOSPADM

## 2023-05-05 RX ORDER — PROCHLORPERAZINE 25 MG
25 SUPPOSITORY, RECTAL RECTAL EVERY 12 HOURS PRN
Status: DISCONTINUED | OUTPATIENT
Start: 2023-05-05 | End: 2023-05-07 | Stop reason: HOSPADM

## 2023-05-05 RX ORDER — MISOPROSTOL 200 UG/1
400 TABLET ORAL
Status: DISCONTINUED | OUTPATIENT
Start: 2023-05-05 | End: 2023-05-05 | Stop reason: HOSPADM

## 2023-05-05 RX ORDER — OXYTOCIN/0.9 % SODIUM CHLORIDE 30/500 ML
340 PLASTIC BAG, INJECTION (ML) INTRAVENOUS CONTINUOUS PRN
Status: DISCONTINUED | OUTPATIENT
Start: 2023-05-05 | End: 2023-05-05 | Stop reason: HOSPADM

## 2023-05-05 RX ORDER — ONDANSETRON 4 MG/1
4 TABLET, ORALLY DISINTEGRATING ORAL EVERY 6 HOURS PRN
Status: DISCONTINUED | OUTPATIENT
Start: 2023-05-05 | End: 2023-05-05 | Stop reason: HOSPADM

## 2023-05-05 RX ORDER — PROCHLORPERAZINE 25 MG
25 SUPPOSITORY, RECTAL RECTAL EVERY 12 HOURS PRN
Status: DISCONTINUED | OUTPATIENT
Start: 2023-05-05 | End: 2023-05-05 | Stop reason: HOSPADM

## 2023-05-05 RX ORDER — IBUPROFEN 800 MG/1
800 TABLET, FILM COATED ORAL
Status: DISCONTINUED | OUTPATIENT
Start: 2023-05-05 | End: 2023-05-07 | Stop reason: HOSPADM

## 2023-05-05 RX ORDER — CARBOPROST TROMETHAMINE 250 UG/ML
250 INJECTION, SOLUTION INTRAMUSCULAR
Status: DISCONTINUED | OUTPATIENT
Start: 2023-05-05 | End: 2023-05-05 | Stop reason: HOSPADM

## 2023-05-05 RX ORDER — KETOROLAC TROMETHAMINE 30 MG/ML
30 INJECTION, SOLUTION INTRAMUSCULAR; INTRAVENOUS
Status: DISCONTINUED | OUTPATIENT
Start: 2023-05-05 | End: 2023-05-07 | Stop reason: HOSPADM

## 2023-05-05 RX ORDER — METOCLOPRAMIDE 10 MG/1
10 TABLET ORAL EVERY 6 HOURS PRN
Status: DISCONTINUED | OUTPATIENT
Start: 2023-05-05 | End: 2023-05-05 | Stop reason: HOSPADM

## 2023-05-05 RX ORDER — PROCHLORPERAZINE MALEATE 10 MG
10 TABLET ORAL EVERY 6 HOURS PRN
Status: DISCONTINUED | OUTPATIENT
Start: 2023-05-05 | End: 2023-05-05 | Stop reason: HOSPADM

## 2023-05-05 RX ORDER — OXYTOCIN/0.9 % SODIUM CHLORIDE 30/500 ML
340 PLASTIC BAG, INJECTION (ML) INTRAVENOUS CONTINUOUS PRN
Status: DISCONTINUED | OUTPATIENT
Start: 2023-05-05 | End: 2023-05-07 | Stop reason: HOSPADM

## 2023-05-05 RX ORDER — OXYTOCIN 10 [USP'U]/ML
10 INJECTION, SOLUTION INTRAMUSCULAR; INTRAVENOUS
Status: DISCONTINUED | OUTPATIENT
Start: 2023-05-05 | End: 2023-05-05 | Stop reason: HOSPADM

## 2023-05-05 RX ORDER — EPHEDRINE SULFATE 50 MG/ML
5 INJECTION, SOLUTION INTRAMUSCULAR; INTRAVENOUS; SUBCUTANEOUS
Status: DISCONTINUED | OUTPATIENT
Start: 2023-05-05 | End: 2023-05-05 | Stop reason: HOSPADM

## 2023-05-05 RX ORDER — MISOPROSTOL 200 UG/1
800 TABLET ORAL
Status: DISCONTINUED | OUTPATIENT
Start: 2023-05-05 | End: 2023-05-05 | Stop reason: HOSPADM

## 2023-05-05 RX ORDER — LIDOCAINE 40 MG/G
CREAM TOPICAL
Status: DISCONTINUED | OUTPATIENT
Start: 2023-05-05 | End: 2023-05-07 | Stop reason: HOSPADM

## 2023-05-05 RX ORDER — SIMETHICONE 80 MG
80 TABLET,CHEWABLE ORAL 4 TIMES DAILY PRN
Status: DISCONTINUED | OUTPATIENT
Start: 2023-05-05 | End: 2023-05-07 | Stop reason: HOSPADM

## 2023-05-05 RX ORDER — LIDOCAINE 40 MG/G
CREAM TOPICAL
Status: DISCONTINUED | OUTPATIENT
Start: 2023-05-05 | End: 2023-05-05 | Stop reason: HOSPADM

## 2023-05-05 RX ORDER — OXYTOCIN 10 [USP'U]/ML
10 INJECTION, SOLUTION INTRAMUSCULAR; INTRAVENOUS
Status: DISCONTINUED | OUTPATIENT
Start: 2023-05-05 | End: 2023-05-07 | Stop reason: HOSPADM

## 2023-05-05 RX ORDER — CEFAZOLIN SODIUM 2 G/100ML
2 INJECTION, SOLUTION INTRAVENOUS SEE ADMIN INSTRUCTIONS
Status: DISCONTINUED | OUTPATIENT
Start: 2023-05-05 | End: 2023-05-05 | Stop reason: HOSPADM

## 2023-05-05 RX ORDER — NALOXONE HYDROCHLORIDE 0.4 MG/ML
0.2 INJECTION, SOLUTION INTRAMUSCULAR; INTRAVENOUS; SUBCUTANEOUS
Status: DISCONTINUED | OUTPATIENT
Start: 2023-05-05 | End: 2023-05-07 | Stop reason: HOSPADM

## 2023-05-05 RX ORDER — OXYTOCIN/0.9 % SODIUM CHLORIDE 30/500 ML
100-340 PLASTIC BAG, INJECTION (ML) INTRAVENOUS CONTINUOUS PRN
Status: DISCONTINUED | OUTPATIENT
Start: 2023-05-05 | End: 2023-05-05

## 2023-05-05 RX ORDER — OXYTOCIN/0.9 % SODIUM CHLORIDE 30/500 ML
100-340 PLASTIC BAG, INJECTION (ML) INTRAVENOUS CONTINUOUS PRN
Status: DISCONTINUED | OUTPATIENT
Start: 2023-05-05 | End: 2023-05-07 | Stop reason: HOSPADM

## 2023-05-05 RX ORDER — NALOXONE HYDROCHLORIDE 0.4 MG/ML
0.2 INJECTION, SOLUTION INTRAMUSCULAR; INTRAVENOUS; SUBCUTANEOUS
Status: DISCONTINUED | OUTPATIENT
Start: 2023-05-05 | End: 2023-05-05 | Stop reason: HOSPADM

## 2023-05-05 RX ORDER — MORPHINE SULFATE 1 MG/ML
INJECTION, SOLUTION EPIDURAL; INTRATHECAL; INTRAVENOUS
Status: DISCONTINUED | OUTPATIENT
Start: 2023-05-05 | End: 2023-05-05

## 2023-05-05 RX ORDER — MISOPROSTOL 200 UG/1
800 TABLET ORAL
Status: DISCONTINUED | OUTPATIENT
Start: 2023-05-05 | End: 2023-05-07 | Stop reason: HOSPADM

## 2023-05-05 RX ORDER — ACETAMINOPHEN 325 MG/1
975 TABLET ORAL ONCE
Status: COMPLETED | OUTPATIENT
Start: 2023-05-05 | End: 2023-05-05

## 2023-05-05 RX ORDER — AMOXICILLIN 250 MG
2 CAPSULE ORAL 2 TIMES DAILY
Status: DISCONTINUED | OUTPATIENT
Start: 2023-05-05 | End: 2023-05-07 | Stop reason: HOSPADM

## 2023-05-05 RX ORDER — ACETAMINOPHEN 325 MG/1
975 TABLET ORAL EVERY 6 HOURS
Status: DISCONTINUED | OUTPATIENT
Start: 2023-05-05 | End: 2023-05-07 | Stop reason: HOSPADM

## 2023-05-05 RX ORDER — BISACODYL 10 MG
10 SUPPOSITORY, RECTAL RECTAL DAILY PRN
Status: DISCONTINUED | OUTPATIENT
Start: 2023-05-07 | End: 2023-05-07 | Stop reason: HOSPADM

## 2023-05-05 RX ORDER — KETOROLAC TROMETHAMINE 30 MG/ML
30 INJECTION, SOLUTION INTRAMUSCULAR; INTRAVENOUS EVERY 6 HOURS
Status: COMPLETED | OUTPATIENT
Start: 2023-05-06 | End: 2023-05-06

## 2023-05-05 RX ORDER — HYDROCORTISONE 25 MG/G
CREAM TOPICAL 3 TIMES DAILY PRN
Status: DISCONTINUED | OUTPATIENT
Start: 2023-05-05 | End: 2023-05-07 | Stop reason: HOSPADM

## 2023-05-05 RX ORDER — METOCLOPRAMIDE HYDROCHLORIDE 5 MG/ML
10 INJECTION INTRAMUSCULAR; INTRAVENOUS EVERY 6 HOURS PRN
Status: DISCONTINUED | OUTPATIENT
Start: 2023-05-05 | End: 2023-05-05 | Stop reason: HOSPADM

## 2023-05-05 RX ORDER — BUPIVACAINE HYDROCHLORIDE 2.5 MG/ML
INJECTION, SOLUTION EPIDURAL; INFILTRATION; INTRACAUDAL
Status: DISCONTINUED | OUTPATIENT
Start: 2023-05-05 | End: 2023-05-05

## 2023-05-05 RX ORDER — METHYLERGONOVINE MALEATE 0.2 MG/ML
200 INJECTION INTRAVENOUS
Status: DISCONTINUED | OUTPATIENT
Start: 2023-05-05 | End: 2023-05-05 | Stop reason: HOSPADM

## 2023-05-05 RX ORDER — ONDANSETRON 2 MG/ML
4 INJECTION INTRAMUSCULAR; INTRAVENOUS EVERY 30 MIN PRN
Status: DISCONTINUED | OUTPATIENT
Start: 2023-05-05 | End: 2023-05-05

## 2023-05-05 RX ORDER — ENOXAPARIN SODIUM 100 MG/ML
40 INJECTION SUBCUTANEOUS EVERY 24 HOURS
Status: DISCONTINUED | OUTPATIENT
Start: 2023-05-06 | End: 2023-05-07 | Stop reason: HOSPADM

## 2023-05-05 RX ORDER — HYDROMORPHONE HCL IN WATER/PF 6 MG/30 ML
0.4 PATIENT CONTROLLED ANALGESIA SYRINGE INTRAVENOUS EVERY 5 MIN PRN
Status: DISCONTINUED | OUTPATIENT
Start: 2023-05-05 | End: 2023-05-07 | Stop reason: HOSPADM

## 2023-05-05 RX ORDER — METOCLOPRAMIDE HYDROCHLORIDE 5 MG/ML
10 INJECTION INTRAMUSCULAR; INTRAVENOUS EVERY 6 HOURS PRN
Status: DISCONTINUED | OUTPATIENT
Start: 2023-05-05 | End: 2023-05-07 | Stop reason: HOSPADM

## 2023-05-05 RX ORDER — BUPIVACAINE HYDROCHLORIDE 7.5 MG/ML
INJECTION, SOLUTION INTRASPINAL
Status: DISCONTINUED | OUTPATIENT
Start: 2023-05-05 | End: 2023-05-05

## 2023-05-05 RX ORDER — ONDANSETRON 4 MG/1
4 TABLET, ORALLY DISINTEGRATING ORAL EVERY 30 MIN PRN
Status: DISCONTINUED | OUTPATIENT
Start: 2023-05-05 | End: 2023-05-05

## 2023-05-05 RX ORDER — ONDANSETRON 2 MG/ML
4 INJECTION INTRAMUSCULAR; INTRAVENOUS EVERY 6 HOURS PRN
Status: DISCONTINUED | OUTPATIENT
Start: 2023-05-05 | End: 2023-05-05 | Stop reason: HOSPADM

## 2023-05-05 RX ORDER — FENTANYL CITRATE 50 UG/ML
INJECTION, SOLUTION INTRAMUSCULAR; INTRAVENOUS PRN
Status: DISCONTINUED | OUTPATIENT
Start: 2023-05-05 | End: 2023-05-05

## 2023-05-05 RX ORDER — METHYLERGONOVINE MALEATE 0.2 MG/ML
200 INJECTION INTRAVENOUS
Status: DISCONTINUED | OUTPATIENT
Start: 2023-05-05 | End: 2023-05-07 | Stop reason: HOSPADM

## 2023-05-05 RX ORDER — SODIUM CHLORIDE, SODIUM LACTATE, POTASSIUM CHLORIDE, CALCIUM CHLORIDE 600; 310; 30; 20 MG/100ML; MG/100ML; MG/100ML; MG/100ML
INJECTION, SOLUTION INTRAVENOUS CONTINUOUS PRN
Status: DISCONTINUED | OUTPATIENT
Start: 2023-05-05 | End: 2023-05-05

## 2023-05-05 RX ORDER — FENTANYL CITRATE 50 UG/ML
25 INJECTION, SOLUTION INTRAMUSCULAR; INTRAVENOUS EVERY 5 MIN PRN
Status: DISCONTINUED | OUTPATIENT
Start: 2023-05-05 | End: 2023-05-05

## 2023-05-05 RX ORDER — MORPHINE SULFATE 1 MG/ML
150 INJECTION, SOLUTION EPIDURAL; INTRATHECAL; INTRAVENOUS ONCE
Status: DISCONTINUED | OUTPATIENT
Start: 2023-05-05 | End: 2023-05-05 | Stop reason: HOSPADM

## 2023-05-05 RX ORDER — CITRIC ACID/SODIUM CITRATE 334-500MG
30 SOLUTION, ORAL ORAL
Status: DISCONTINUED | OUTPATIENT
Start: 2023-05-05 | End: 2023-05-05 | Stop reason: HOSPADM

## 2023-05-05 RX ORDER — METOCLOPRAMIDE 10 MG/1
10 TABLET ORAL EVERY 6 HOURS PRN
Status: DISCONTINUED | OUTPATIENT
Start: 2023-05-05 | End: 2023-05-07 | Stop reason: HOSPADM

## 2023-05-05 RX ORDER — CEFAZOLIN SODIUM 2 G/100ML
2 INJECTION, SOLUTION INTRAVENOUS
Status: COMPLETED | OUTPATIENT
Start: 2023-05-05 | End: 2023-05-05

## 2023-05-05 RX ORDER — HYDROMORPHONE HCL IN WATER/PF 6 MG/30 ML
0.2 PATIENT CONTROLLED ANALGESIA SYRINGE INTRAVENOUS EVERY 5 MIN PRN
Status: DISCONTINUED | OUTPATIENT
Start: 2023-05-05 | End: 2023-05-07 | Stop reason: HOSPADM

## 2023-05-05 RX ORDER — PROCHLORPERAZINE MALEATE 10 MG
10 TABLET ORAL EVERY 6 HOURS PRN
Status: DISCONTINUED | OUTPATIENT
Start: 2023-05-05 | End: 2023-05-07 | Stop reason: HOSPADM

## 2023-05-05 RX ORDER — IBUPROFEN 800 MG/1
800 TABLET, FILM COATED ORAL EVERY 6 HOURS
Status: DISCONTINUED | OUTPATIENT
Start: 2023-05-06 | End: 2023-05-07 | Stop reason: HOSPADM

## 2023-05-05 RX ORDER — CITRIC ACID/SODIUM CITRATE 334-500MG
30 SOLUTION, ORAL ORAL
Status: COMPLETED | OUTPATIENT
Start: 2023-05-05 | End: 2023-05-05

## 2023-05-05 RX ORDER — OXYCODONE HYDROCHLORIDE 5 MG/1
5 TABLET ORAL EVERY 4 HOURS PRN
Status: DISCONTINUED | OUTPATIENT
Start: 2023-05-05 | End: 2023-05-07 | Stop reason: HOSPADM

## 2023-05-05 RX ADMIN — BUPIVACAINE HYDROCHLORIDE 10 ML: 2.5 INJECTION, SOLUTION EPIDURAL; INFILTRATION; INTRACAUDAL at 18:54

## 2023-05-05 RX ADMIN — BUPIVACAINE HYDROCHLORIDE IN DEXTROSE 1.6 ML: 7.5 INJECTION, SOLUTION SUBARACHNOID at 18:05

## 2023-05-05 RX ADMIN — ONDANSETRON 4 MG: 2 INJECTION INTRAMUSCULAR; INTRAVENOUS at 18:41

## 2023-05-05 RX ADMIN — Medication 600 ML/HR: at 18:28

## 2023-05-05 RX ADMIN — CEFAZOLIN SODIUM 2 G: 2 INJECTION, SOLUTION INTRAVENOUS at 18:14

## 2023-05-05 RX ADMIN — ACETAMINOPHEN 975 MG: 325 TABLET ORAL at 16:53

## 2023-05-05 RX ADMIN — SODIUM CITRATE AND CITRIC ACID MONOHYDRATE 30 ML: 500; 334 SOLUTION ORAL at 16:53

## 2023-05-05 RX ADMIN — FENTANYL CITRATE 50 MCG: 50 INJECTION, SOLUTION INTRAMUSCULAR; INTRAVENOUS at 18:49

## 2023-05-05 RX ADMIN — BUPIVACAINE 10 ML: 13.3 INJECTION, SUSPENSION, LIPOSOMAL INFILTRATION at 18:58

## 2023-05-05 RX ADMIN — BUPIVACAINE 10 ML: 13.3 INJECTION, SUSPENSION, LIPOSOMAL INFILTRATION at 18:54

## 2023-05-05 RX ADMIN — SODIUM CHLORIDE, POTASSIUM CHLORIDE, SODIUM LACTATE AND CALCIUM CHLORIDE: 600; 310; 30; 20 INJECTION, SOLUTION INTRAVENOUS at 20:08

## 2023-05-05 RX ADMIN — Medication 0.15 MG: at 18:05

## 2023-05-05 RX ADMIN — PHENYLEPHRINE HYDROCHLORIDE 0.5 MCG/KG/MIN: 10 INJECTION INTRAVENOUS at 18:09

## 2023-05-05 RX ADMIN — NALBUPHINE HYDROCHLORIDE 2.5 MG: 10 INJECTION, SOLUTION INTRAMUSCULAR; INTRAVENOUS; SUBCUTANEOUS at 20:19

## 2023-05-05 RX ADMIN — SODIUM CHLORIDE, POTASSIUM CHLORIDE, SODIUM LACTATE AND CALCIUM CHLORIDE 200 ML/HR: 600; 310; 30; 20 INJECTION, SOLUTION INTRAVENOUS at 16:00

## 2023-05-05 RX ADMIN — BUPIVACAINE HYDROCHLORIDE 10 ML: 2.5 INJECTION, SOLUTION EPIDURAL; INFILTRATION; INTRACAUDAL at 18:58

## 2023-05-05 RX ADMIN — FENTANYL CITRATE 50 MCG: 50 INJECTION, SOLUTION INTRAMUSCULAR; INTRAVENOUS at 18:57

## 2023-05-05 RX ADMIN — NALBUPHINE HYDROCHLORIDE 2.5 MG: 10 INJECTION, SOLUTION INTRAMUSCULAR; INTRAVENOUS; SUBCUTANEOUS at 22:33

## 2023-05-05 RX ADMIN — SODIUM CHLORIDE, POTASSIUM CHLORIDE, SODIUM LACTATE AND CALCIUM CHLORIDE: 600; 310; 30; 20 INJECTION, SOLUTION INTRAVENOUS at 18:13

## 2023-05-05 RX ADMIN — SENNOSIDES AND DOCUSATE SODIUM 1 TABLET: 50; 8.6 TABLET ORAL at 21:31

## 2023-05-05 RX ADMIN — KETOROLAC TROMETHAMINE 15 MG: 30 INJECTION, SOLUTION INTRAMUSCULAR at 18:56

## 2023-05-05 RX ADMIN — ACETAMINOPHEN 975 MG: 325 TABLET ORAL at 22:33

## 2023-05-05 RX ADMIN — OXYCODONE HYDROCHLORIDE 5 MG: 5 TABLET ORAL at 22:33

## 2023-05-05 ASSESSMENT — ACTIVITIES OF DAILY LIVING (ADL)
DOING_ERRANDS_INDEPENDENTLY_DIFFICULTY: NO
DRESSING/BATHING_DIFFICULTY: NO
FALL_HISTORY_WITHIN_LAST_SIX_MONTHS: NO
ADLS_ACUITY_SCORE: 18
DIFFICULTY_EATING/SWALLOWING: NO
CHANGE_IN_FUNCTIONAL_STATUS_SINCE_ONSET_OF_CURRENT_ILLNESS/INJURY: NO
WALKING_OR_CLIMBING_STAIRS_DIFFICULTY: NO
ADLS_ACUITY_SCORE: 31
ADLS_ACUITY_SCORE: 18
CONCENTRATING,_REMEMBERING_OR_MAKING_DECISIONS_DIFFICULTY: NO
TOILETING_ISSUES: NO
ADLS_ACUITY_SCORE: 18
ADLS_ACUITY_SCORE: 35
WEAR_GLASSES_OR_BLIND: NO

## 2023-05-05 NOTE — H&P
[facility] Labor and Delivery History and Physical    Claudio Bliss MRN# [id number]   Age: 32 year old YOB: 1990     Date of Admission:  2023    Primary care provider: Marylou Roman           Chief Complaint:   Claudio Bliss is a 32 year old female who is 37w0d pregnant who presented from clinic due to elevated blood pressure. In triage she continued to have elevated blood pressures in the 140s-150s/80s. Hellp labs were normal with the exception of proteinuria. She complains of a headache.           Pregnancy history:     OBSTETRIC HISTORY:    OB History    Para Term  AB Living   3 2 2 0 0 2   SAB IAB Ectopic Multiple Live Births   0 0 0 0 2      # Outcome Date GA Lbr Jordi/2nd Weight Sex Delivery Anes PTL Lv   3 Current            2 Term 10/29/20 39w1d  3.63 kg (8 lb) F CS-LTranv Spinal  URSZULA      Name: KYLEEFEMALE-CLAUDIO      Apgar1: 7  Apgar5: 9   1 Term 18 38w6d 05:00 / 03:44 3.884 kg (8 lb 9 oz) M Vag-Spont EPI N URSZULA      Name: CHLOEMALE-CLAUDIO      Apgar1: 5  Apgar5: 6       EDC: Estimated Date of Delivery: 23  Prenatal complications: GDM on insulin at night, h/o gestational HTN, h/o  desires repeat    Prenatal Labs:   Lab Results   Component Value Date    AS Positive (A) 10/29/2020    HGB 10.9 (L) 2023       GBS Status:   Lab Results   Component Value Date    GBS Negative 2023       Active Problem List  Patient Active Problem List   Diagnosis     Insulin controlled gestational diabetes mellitus (GDM) in third trimester     Delivery normal     Malpresentation before onset of labor     History of shoulder dystocia in prior pregnancy      delivery delivered     Umbilical hernia without obstruction and without gangrene     Encounter for triage in pregnant patient     Pregnant       Medication Prior to Admission  Medications Prior to Admission   Medication Sig Dispense Refill Last Dose     blood glucose (NO BRAND  SPECIFIED) test strip Use to test blood sugar 5 times daily or as directed. 200 strip 0      blood-glucose meter Misc [BLOOD-GLUCOSE METER MISC] Please dispense Accu Check Guide meter +compatible  testing supplies OR any meter + compatible testing supplies per insurance formualry 1 each 0      cholecalciferol, vitamin D3, 1,000 unit tablet [CHOLECALCIFEROL, VITAMIN D3, 1,000 UNIT TABLET] Take 1,000 Units by mouth daily.        generic lancets [GENERIC LANCETS] Test 4 times daily 150 each 2      insulin NPH (HUMULIN N KWIKPEN) 100 UNIT/ML injection Inject 5 Units Subcutaneous At Bedtime 15 mL 0      insulin pen needle (32G X 4 MM) 32G X 4 MM miscellaneous Use 1 pen needles daily or as directed. 50 each 0      magnesium 200 MG TABS Take 200 mg by mouth daily        prenatal vitamin iron-folic acid 27mg-0.8mg (PRENATAL S) 27 mg iron- 800 mcg Tab tablet [PRENATAL VITAMIN IRON-FOLIC ACID 27MG-0.8MG (PRENATAL S) 27 MG IRON- 800 MCG TAB TABLET] Take 1 tablet by mouth daily.      .        Maternal Past Medical History:     Past Medical History:   Diagnosis Date     Gestational diabetes      Hypertension     gestational HTN     Migraines                        Review of Systems:   The Review of Systems is negative other than noted in the HPI          Physical Exam:   Vitals were reviewed  Temp: 98.7  F (37.1  C) Temp src: Oral BP: (!) 144/89 Pulse: 85   Resp: 16        Gen: NAD  Abd: gravid, soft, NTTP  CV: RRR  Chest: CTAB  SVE: deferred  TOCO: no contractions  FHT: Cat 1    Results for orders placed or performed during the hospital encounter of 05/05/23 (from the past 24 hour(s))   CBC with platelets   Result Value Ref Range    WBC Count 9.8 4.0 - 11.0 10e3/uL    RBC Count 3.64 (L) 3.80 - 5.20 10e6/uL    Hemoglobin 10.9 (L) 11.7 - 15.7 g/dL    Hematocrit 33.9 (L) 35.0 - 47.0 %    MCV 93 78 - 100 fL    MCH 29.9 26.5 - 33.0 pg    MCHC 32.2 31.5 - 36.5 g/dL    RDW 13.1 10.0 - 15.0 %    Platelet Count 196 150 - 450 10e3/uL    Comprehensive Metabolic Panel   Result Value Ref Range    Sodium 139 136 - 145 mmol/L    Potassium 4.0 3.5 - 5.0 mmol/L    Chloride 106 98 - 107 mmol/L    Carbon Dioxide (CO2) 23 22 - 31 mmol/L    Anion Gap 10 5 - 18 mmol/L    Urea Nitrogen 12 8 - 22 mg/dL    Creatinine 0.69 0.60 - 1.10 mg/dL    Calcium 10.2 8.5 - 10.5 mg/dL    Glucose 70 70 - 125 mg/dL    Alkaline Phosphatase 71 45 - 120 U/L    AST 12 0 - 40 U/L    ALT 11 0 - 45 U/L    Protein Total 6.4 6.0 - 8.0 g/dL    Albumin 3.0 (L) 3.5 - 5.0 g/dL    Bilirubin Total 0.3 0.0 - 1.0 mg/dL    GFR Estimate >90 >60 mL/min/1.73m2   Uric acid   Result Value Ref Range    Uric Acid 5.6 2.0 - 7.5 mg/dL   ABO/Rh type and screen *Canceled*    Narrative    The following orders were created for panel order ABO/Rh type and screen.  Procedure                               Abnormality         Status                     ---------                               -----------         ------                     Adult Type and Screen[[id number]]                                                         Please view results for these tests on the individual orders.   Protein  random urine   Result Value Ref Range    Total Protein Urine mg/dL 8.9 1.0 - 14.0 mg/dL    Total Protein UR MG/MG CR 0.52 mg/mg Cr    Creatinine Urine mg/dL 17 mg/dL    Narrative    The reference range has not been established for total protein, creatinine and the protein mg/mg creatinine  in random urine samples. The result should be integrated into the clinical context for interpretation.     The reference range has not been established for creatinine and the protein mg/mg creatinine in random urine samples. The result should be integrated into the clinical context for interpretation.     ABO/Rh type and screen *Canceled*    Narrative    The following orders were created for panel order ABO/Rh type and screen.  Procedure                               Abnormality         Status                     ---------            "                    -----------         ------                       Please view results for these tests on the individual orders.   Extra Tube    Narrative    The following orders were created for panel order Extra Tube.  Procedure                               Abnormality         Status                     ---------                               -----------         ------                     Extra Purple Top Tube[[id number]]                            Final result               Extra Purple Top Tube[[id number]]                            Final result                 Please view results for these tests on the individual orders.   Extra Purple Top Tube   Result Value Ref Range    Hold Specimen JIC    Extra Purple Top Tube   Result Value Ref Range    Hold Specimen JIC    Glucose by meter   Result Value Ref Range    GLUCOSE BY METER POCT 77 70 - 99 mg/dL   POC US Guidance Needle Placement    Narrative    Ultrasound was performed as guidance to an anesthesia procedure.  Click   \"PACS images\" hyperlink below to view any stored images.  For specific   procedure details, view procedure note authored by anesthesia.           Assessment:   Donya Bliss is a 37w0d pregnant female admitted with preeclampsia without severe features. Recommend proceeding with delivery today.  Alternative would be holding off with close monitoring. She agrees to delivery today.          Plan:   Admit - see IP orders  Based on when she last ate and anesthesia schedule, surgery scheduled for . She also desires permanent sterilization. Will plan on repeat  and bilateral salpingectomy.     Vivi Galvin MD  "

## 2023-05-05 NOTE — ANESTHESIA PREPROCEDURE EVALUATION
Anesthesia Pre-Procedure Evaluation    Patient: Donya Bliss   MRN: [id number] : 1990        Procedure : Procedure(s):  REPEAT  SECTION BILATERAL TUBAL LIGATION VIA SALPINGECTOMY          Past Medical History:   Diagnosis Date     Gestational diabetes      Hypertension     gestational HTN     Migraines       Past Surgical History:   Procedure Laterality Date      SECTION N/A 10/29/2020    Procedure: PRIMARY  SECTION;  Surgeon: Marylou Roman MD;  Location: [facility] L+D OR;  Service: Obstetrics     MAMMOPLASTY REDUCTION  2009     WISDOM TOOTH EXTRACTION Bilateral       No Known Allergies   Social History     Tobacco Use     Smoking status: Never     Smokeless tobacco: Never   Vaping Use     Vaping status: Not on file   Substance Use Topics     Alcohol use: No      Wt Readings from Last 1 Encounters:   23 94.3 kg (208 lb)        Anesthesia Evaluation   Pt has had prior anesthetic.     No history of anesthetic complications       ROS/MED HX  ENT/Pulmonary:       Neurologic:       Cardiovascular: Comment: No meds, mild HA. PCR .5    (+) --PIH ---    METS/Exercise Tolerance:     Hematologic:       Musculoskeletal:       GI/Hepatic:       Renal/Genitourinary:       Endo:     (+) type I DM, Using insulin, Obesity,     Psychiatric/Substance Use:       Infectious Disease:       Malignancy:       Other:            Physical Exam    Airway             Respiratory Devices and Support         Dental           Cardiovascular             Pulmonary               Other findings:  (< 6 hrs), Hgb 10.9    OUTSIDE LABS:  CBC:   Lab Results   Component Value Date    WBC 9.8 2023    WBC 11.0 2023    HGB 10.9 (L) 2023    HGB 11.0 (L) 2023    HCT 33.9 (L) 2023    HCT 33.0 (L) 2023     2023     2023     BMP:   Lab Results   Component Value Date     2023     2021    POTASSIUM 4.0 2023     POTASSIUM 3.9 03/23/2021    CHLORIDE 106 05/05/2023    CHLORIDE 107 03/23/2021    CO2 23 05/05/2023    CO2 23 03/23/2021    BUN 12 05/05/2023    BUN 16 03/23/2021    CR 0.69 05/05/2023    CR 0.61 04/07/2023    GLC 77 05/05/2023    GLC 70 05/05/2023     COAGS:   Lab Results   Component Value Date    PTT 27 05/16/2018    INR 0.96 05/16/2018     POC: No results found for: BGM, HCG, HCGS  HEPATIC:   Lab Results   Component Value Date    ALBUMIN 3.0 (L) 05/05/2023    PROTTOTAL 6.4 05/05/2023    ALT 11 05/05/2023    AST 12 05/05/2023    ALKPHOS 71 05/05/2023    BILITOTAL 0.3 05/05/2023     OTHER:   Lab Results   Component Value Date    RONNIE 10.2 05/05/2023    CRP 1.4 (H) 03/23/2021       Anesthesia Plan    ASA Status:  4, emergent    NPO Status:  NPO Appropriate    Anesthesia Type: Spinal.              Consents    Anesthesia Plan(s) and associated risks, benefits, and realistic alternatives discussed. Questions answered and patient/representative(s) expressed understanding.    - Discussed:     - Discussed with:  Patient, Spouse         Postoperative Care    Pain management: Peripheral nerve block (Single Shot), intrathecal morphine.   PONV prophylaxis: Ondansetron (or other 5HT-3)     Comments:                David Pringle MD

## 2023-05-05 NOTE — PLAN OF CARE
Problem:  Delivery  Goal: Stable Fetal Wellbeing  Outcome: Progressing     Problem:  Delivery  Goal: Effective Oxygenation and Ventilation  Outcome: Progressing   Goal Outcome Evaluation:

## 2023-05-05 NOTE — PROGRESS NOTES
Pt arrived to [facility] for r/o preeclampsia. . GHNT and IDGDM. Pt brought to room , placed on EFM. FHT's reactive cat I. Uterus soft, non tender. No ctx's noted per EFM, pt denies feeling ctx's. See flowsheet for BP's, other VSS. Pt states she has a headache 3/10 and has some nausea. Denies blurred vision, epigastric pain. Mild, non pitting edema noted generally. Reflexes normal, no clonus. Provider notified, orders placed. PCR 0.5+, other labs WNL. Last PO 0815. Provider aware of results and assessments. Order to admit pt for repeat  today. Pt states understanding.

## 2023-05-06 LAB
GLUCOSE BLDC GLUCOMTR-MCNC: 77 MG/DL (ref 70–99)
HGB BLD-MCNC: 9.1 G/DL (ref 11.7–15.7)
T PALLIDUM AB SER QL: NONREACTIVE

## 2023-05-06 PROCEDURE — 250N000013 HC RX MED GY IP 250 OP 250 PS 637: Performed by: OBSTETRICS & GYNECOLOGY

## 2023-05-06 PROCEDURE — 250N000011 HC RX IP 250 OP 636: Performed by: OBSTETRICS & GYNECOLOGY

## 2023-05-06 PROCEDURE — 36415 COLL VENOUS BLD VENIPUNCTURE: CPT | Performed by: OBSTETRICS & GYNECOLOGY

## 2023-05-06 PROCEDURE — 120N000001 HC R&B MED SURG/OB

## 2023-05-06 PROCEDURE — 85018 HEMOGLOBIN: CPT | Performed by: OBSTETRICS & GYNECOLOGY

## 2023-05-06 RX ADMIN — SENNOSIDES AND DOCUSATE SODIUM 1 TABLET: 50; 8.6 TABLET ORAL at 20:31

## 2023-05-06 RX ADMIN — ACETAMINOPHEN 975 MG: 325 TABLET ORAL at 18:10

## 2023-05-06 RX ADMIN — SENNOSIDES AND DOCUSATE SODIUM 1 TABLET: 50; 8.6 TABLET ORAL at 08:05

## 2023-05-06 RX ADMIN — ENOXAPARIN SODIUM 40 MG: 100 INJECTION SUBCUTANEOUS at 08:05

## 2023-05-06 RX ADMIN — IBUPROFEN 800 MG: 800 TABLET ORAL at 19:01

## 2023-05-06 RX ADMIN — KETOROLAC TROMETHAMINE 30 MG: 30 INJECTION, SOLUTION INTRAMUSCULAR; INTRAVENOUS at 13:33

## 2023-05-06 RX ADMIN — KETOROLAC TROMETHAMINE 30 MG: 30 INJECTION, SOLUTION INTRAMUSCULAR; INTRAVENOUS at 01:33

## 2023-05-06 RX ADMIN — KETOROLAC TROMETHAMINE 30 MG: 30 INJECTION, SOLUTION INTRAMUSCULAR; INTRAVENOUS at 07:18

## 2023-05-06 RX ADMIN — ACETAMINOPHEN 975 MG: 325 TABLET ORAL at 04:44

## 2023-05-06 RX ADMIN — ACETAMINOPHEN 975 MG: 325 TABLET ORAL at 10:46

## 2023-05-06 ASSESSMENT — ACTIVITIES OF DAILY LIVING (ADL)
ADLS_ACUITY_SCORE: 24
ADLS_ACUITY_SCORE: 24
ADLS_ACUITY_SCORE: 20
ADLS_ACUITY_SCORE: 24
ADLS_ACUITY_SCORE: 20
ADLS_ACUITY_SCORE: 24

## 2023-05-06 NOTE — PROGRESS NOTES
"Postpartum Day 1:     Subjective:  The patient feels well. The patient has no emotional concerns. Pain is well controlled with current medications. The patient is ambulating well. She is voiding and passing gas.The amount and color of the lochia is appropriate for the duration of recovery, patient denies clots. The baby is well.    Objective   The patient has a blood pressure which is within the normal range. Urinary output is adequate.     Exam:   /74 (BP Location: Left arm, Patient Position: Semi-Berger's, Cuff Size: Adult Regular)   Pulse 98   Temp 97.7  F (36.5  C) (Oral)   Resp 16   Ht 1.702 m (5' 7\")   Wt 94.3 kg (208 lb)   SpO2 99%   Breastfeeding Unknown   BMI 32.58 kg/m    General: NAD, alert and orientated   Abdomen: soft, NT   Fundus: firm, nontender  Incision: covered, C/D/I  Ext: no pain     Lab Results   Component Value Date    HGB 9.1 (L) 2023       Impression:   Normal postpartum course, POD#1 LTCS secondary to prior  and gestational HTN.    Plan:   - DC catheter  - switch to oral pain meds  - encourage ambulation   - Continue current care.      Vivi Galvin MD on 2023 at 10:27 AM    "

## 2023-05-06 NOTE — ANESTHESIA CARE TRANSFER NOTE
Patient: Donya Bliss    Procedure: Procedure(s):  REPEAT  SECTION BILATERAL TUBAL LIGATION VIA SALPINGECTOMY       Diagnosis: Previous  delivery, antepartum [O34.219]  Diagnosis Additional Information: No value filed.    Anesthesia Type:   Spinal     Note:    Oropharynx: oropharynx clear of all foreign objects and spontaneously breathing  Level of Consciousness: awake  Oxygen Supplementation: room air    Independent Airway: airway patency satisfactory and stable  Dentition: dentition unchanged  Vital Signs Stable: post-procedure vital signs reviewed and stable  Report to RN Given: handoff report given  Patient transferred to: Labor and Delivery    Handoff Report: Identifed the Patient, Identified the Reponsible Provider, Reviewed the pertinent medical history, Discussed the surgical course, Reviewed Intra-OP anesthesia mangement and issues during anesthesia, Set expectations for post-procedure period and Allowed opportunity for questions and acknowledgement of understanding      Vitals:  Vitals Value Taken Time   /84 23   Temp 36.8  C (98.2  F) 23   Pulse 104 23   Resp 18 23   SpO2 99 % 23       Electronically Signed By: RENETTA Poon CRNA  May 5, 2023  7:19 PM

## 2023-05-06 NOTE — ANESTHESIA PROCEDURE NOTES
"Intrathecal injection Procedure Note    Pre-Procedure   Staff -        Anesthesiologist:  David Pringle MD       Performed By: anesthesiologist       Location: OR       Procedure Start/Stop Times: 5/5/2023 6:05 PM and 5/5/2023 6:08 PM       Pre-Anesthestic Checklist: patient identified, IV checked, risks and benefits discussed, informed consent, monitors and equipment checked and pre-op evaluation  Timeout:       Correct Patient: Yes        Correct Procedure: Yes        Correct Site: Yes        Correct Position: Yes   Procedure Documentation  Procedure: intrathecal injection       Patient Position: sitting       Patient Prep/Sterile Barriers: sterile gloves, mask, patient draped       Skin prep: Chloraprep       Insertion Site: L3-4. (midline approach).       Needle Gauge: 25.        Needle Length (Inches): 3.5        Spinal Needle Type: Pencan       Introducer used       Introducer: 20 G       # of attempts: 1 and  # of redirects:     Assessment/Narrative         Paresthesias: No.       CSF fluid: clear.       Opening pressure was cmH2O while  Sitting.      Medication(s) Administered   0.75% Hyperbaric Bupivacaine (Intrathecal) - Intrathecal   1.6 mL - 5/5/2023 6:05:00 PM  Morphine PF 1 mg/mL (Intrathecal) - Intrathecal   0.15 mg - 5/5/2023 6:05:00 PM  Medication Administration Time: 5/5/2023 6:05 PM      FOR [facility] ([facility] [place], [place]) ONLY:   Pain Team Contact information: please page the Pain Team Via [employer]. Search \"Pain\". During daytime hours, please page the attending first. At night please page the resident first.      "

## 2023-05-06 NOTE — OP NOTE
[facility] Operative Note    Patient Name: Donya Bliss   Patient :  1990  Patient MRN:  [id number]    Procedure date: 2023    Pre-operative diagnosis: Preeclampsia  Previous , desires repeat  GDM on insulin  Desires permanent sterilization     Post-operative diagnosis: Same plus live male infant     Procedure: Procedure(s):  REPEAT  SECTION BILATERAL TUBAL LIGATION VIA SALPINGECTOMY      Surgeon: Vivi Galvin MD     Assistants(s): OR staff     Anesthesia: Spinal with Block      Estimated blood loss: 350ml      Specimens: ID Type Source Tests Collected by Time Destination   A :  Tissue Fallopian Tube, Right SEE PROVIDERS ORDERS Vivi Galvin MD 2023  5:58 PM    B :  Tissue Fallopian Tube, Left SEE PROVIDERS ORDERS Vivi Galvin MD 2023  5:58 PM          Findings: Male infant, vertex, apgars 7/9, weight 7lbs 4oz. Normal uterus, tubes and ovaries.     Complications: None.     Condition: Stable       Description of procedure:  Risks, benefits and alternatives were reviewed, including the risks of bleeding, infection and injury to pelvic structures including bladder, bowel and ureters.  After both verbal and written consent were obtained, the patient was taken to [facility] L&D Operating Room where anesthesia was administered without difficulty.  Gaytan catheter was placed and preoperative antibiotics were administered.  The patient was prepped and draped in the usual sterile fashion in the dorsal supine position with a leftward tilt.    After testing for appropriate anesthesia, a Pfannenstiel skin incision was made with scalpel at her previous incision site and carried down to the underlying layer of fascia with both sharp and Bovie cautery.  The fascia was nicked in the midline and this incision was extended laterally with Harry scissors.  The fascia was dissected off the underlying rectus muscles with both sharp and blunt  dissection.  The rectus muscle was  bluntly in the midline and the peritoneum was entered and  bluntly as well.s.      The bladder blade was reinserted and the lower uterine segment was incised in a transverse fashion with scalpel and the incision was extended bluntly.  Amniotomy was performed with clear fluid noted.  The  head was brought to the incision site. Due to difficulty delivering the head, vacuum was applied and head was delivered.   The nose and mouth were bulb suctioned.  The remainder of the  was delivered, cord clamped x 2 and cut.   was handed off to waiting OR staff.    The placenta was delivered manually and the uterus was exteriorized and cleared of all clot and debris.  The lower uterine segment was repaired in a running locked fashion with 0-Monocryl.  A second layer was performed in an imbricating manner using 0-Monocryl.      Attention was turned to the fallopian tubes. The right tube was grasped with a [place] and the tube was excised using the Ligasure device. The same was done on the left. The uterus was returned to the abdomen.  The hysterotomy site was examined and found to be hemostatic.  The adnexa were examined and found to be hemostatic.     The rectus muscles were bluntly reapproximated in the midline.  The fascia was closed with 0-loop PDS in a running fashion.  The subcutaneous tissues were examined and any bleeding was gently coagulated with Bovie cautery.  The subcutaneous tissue was not reapproximated.  The skin was closed with 3-0 Monocryl in a running fashion.  Steri-strips and bandages were applied.     The patient tolerated the procedure well.  All sponge, lap, and needle counts were correct x 2 by OR staff.  The patient was transferred to her Recovery Room in stable condition.      Vivi Galvin MD

## 2023-05-06 NOTE — PROVIDER NOTIFICATION
Called Dr. Galvin at 2000 regarding two blood pressures. Per Dr. Galvin continue to monitor and notify her if there is a severe range pressure. No further orders at this time.     Jessie Ruiz RN

## 2023-05-06 NOTE — PLAN OF CARE
Patient doing well this morning. Normotensive. Some old drainage noted on mepilex dressing- will remove ARTURO around 24 hour mariajose. Lua in. Patient ambulated once overnight and tolerated activity. Plan is to ambulate after patient eats breakfast and remove lua at that time.  Pain managed with scheduled tylenol/Toradol and occasional PRN oxycodone and ice pack.

## 2023-05-06 NOTE — PLAN OF CARE
Problem:  Delivery  Goal: Bleeding is Controlled  Outcome: Progressing     Problem: Plan of Care - These are the overarching goals to be used throughout the patient stay.    Goal: Optimal Comfort and Wellbeing  Outcome: Progressing  Intervention: Provide Person-Centered Care  Recent Flowsheet Documentation  Taken 2023 0000 by Jacklyn Morrow RN  Trust Relationship/Rapport: care explained  Taken 2023 2228 by Jacklyn Morrow RN  Trust Relationship/Rapport: care explained\     Goal Outcome Evaluation:    Patient had 4 elevated Bps post , MD notified. The rest of the patients BP were WDL. Pain controlled with PRN oxy, and scheduled tylenol, toradol and ice application. Ambulated in room.  Eating and drinking without N/V. Denies any headaches, dizziness or blurred vision. Fundus is firm and midline. Bleeding WDL. Gaytan still in place to promote rest throughout the night. Formula feeding infant, responsive to all infant cues.

## 2023-05-06 NOTE — ANESTHESIA PROCEDURE NOTES
TAP Procedure Note    Pre-Procedure   Staff -        Anesthesiologist:  David Pringle MD       Performed By: anesthesiologist       Location: OR       Procedure Start/Stop Times: 5/5/2023 6:54 PM and 5/5/2023 6:58 PM       Pre-Anesthestic Checklist: patient identified, IV checked, site marked, risks and benefits discussed, informed consent, monitors and equipment checked, pre-op evaluation, at physician/surgeon's request and post-op pain management  Timeout:       Correct Patient: Yes        Correct Procedure: Yes        Correct Site: Yes        Correct Position: Yes        Correct Laterality: Yes        Site Marked: Yes  Procedure Documentation  Procedure: TAP       Laterality: bilateral       Patient Position: supine       Patient Prep/Sterile Barriers: sterile gloves, mask       Skin prep: Chloraprep       Needle Type: insulated       Needle Gauge: 20.        Needle Length (Inches): 4        Ultrasound guided       1. Ultrasound was used to identify targeted nerve, plexus, vascular marker, or fascial plane and place a needle adjacent to it in real-time.       2. Ultrasound was used to visualize the spread of anesthetic in close proximity to the above referenced structure.       3. A permanent image is entered into the patient's record.       4. The visualized anatomic structures appeared normal.       5. There were no apparent abnormal pathologic findings.    Assessment/Narrative         The placement was negative for: blood aspirated, painful injection and site bleeding       Paresthesias: No.       Bolus given via needle..        Secured via.        Insertion/Infusion Method: Single Shot       Complications: none       Injection made incrementally with aspirations every 5 mL.    Medication(s) Administered   Bupivacaine 0.25% PF (Infiltration) - Infiltration   10 mL - 5/5/2023 6:54:00 PM   10 mL - 5/5/2023 6:58:00 PM  Bupivacaine liposome (Exparel) 1.3% LA inj susp (Infiltration) - Infiltration   10 mL -  "5/5/2023 6:54:00 PM   10 mL - 5/5/2023 6:58:00 PM  Medication Administration Time: 5/5/2023 6:54 PM     Comments:  10 mL bupivicaine 0.25% + 10 mL Exparel 1.3% given on each side      FOR [facility] ([facility]) ONLY:   Pain Team Contact information: please page the Pain Team Via [email or web address]. Search \"Pain\". During daytime hours, please page the attending first. At night please page the resident first.      "

## 2023-05-06 NOTE — ANESTHESIA POSTPROCEDURE EVALUATION
Patient: Donya Bliss    Procedure: Procedure(s):  REPEAT  SECTION BILATERAL TUBAL LIGATION VIA SALPINGECTOMY       Anesthesia Type:  Spinal    Note:  Disposition: Inpatient   Postop Pain Control: Uneventful            Sign Out: Well controlled pain   PONV: No   Neuro/Psych: Uneventful            Sign Out: Acceptable/Baseline neuro status   Airway/Respiratory: Uneventful            Sign Out: Acceptable/Baseline resp. status   CV/Hemodynamics: Uneventful            Sign Out: Acceptable CV status; No obvious hypovolemia; No obvious fluid overload   Other NRE: NONE   DID A NON-ROUTINE EVENT OCCUR? No    Event details/Postop Comments:  Numbness resolved, comfortable, denies headache or backache. No complaints or concerns.               Last vitals:  Vitals:    23 2331 23 0033 23 0436   BP: 127/66 123/68 121/68   Pulse: 95 78 78   Resp: 18 17 18   Temp: 37  C (98.6  F) 36.9  C (98.5  F) 37.1  C (98.7  F)   SpO2: 97% 96%        Electronically Signed By: David Pringle MD  May 6, 2023  6:27 AM   [facility])  Cardiac Electrophysiology; Cardiovascular Disease; Internal Medicine  [address] [place] [address]  Phone: [phone]  Fax: [phone]  Follow Up Time:

## 2023-05-07 VITALS
BODY MASS INDEX: 35.44 KG/M2 | TEMPERATURE: 97.9 F | DIASTOLIC BLOOD PRESSURE: 75 MMHG | OXYGEN SATURATION: 97 % | HEIGHT: 67 IN | RESPIRATION RATE: 16 BRPM | HEART RATE: 88 BPM | SYSTOLIC BLOOD PRESSURE: 138 MMHG | WEIGHT: 225.8 LBS

## 2023-05-07 PROBLEM — O14.90 PREECLAMPSIA: Status: ACTIVE | Noted: 2023-05-07

## 2023-05-07 PROCEDURE — 250N000011 HC RX IP 250 OP 636: Performed by: OBSTETRICS & GYNECOLOGY

## 2023-05-07 PROCEDURE — 250N000013 HC RX MED GY IP 250 OP 250 PS 637: Performed by: OBSTETRICS & GYNECOLOGY

## 2023-05-07 RX ORDER — ACETAMINOPHEN 325 MG/1
975 TABLET ORAL EVERY 6 HOURS PRN
COMMUNITY
Start: 2023-05-07

## 2023-05-07 RX ORDER — AMOXICILLIN 250 MG
1 CAPSULE ORAL 2 TIMES DAILY PRN
COMMUNITY
Start: 2023-05-07

## 2023-05-07 RX ORDER — IBUPROFEN 800 MG/1
800 TABLET, FILM COATED ORAL EVERY 6 HOURS PRN
Qty: 30 TABLET | Refills: 0 | Status: SHIPPED | OUTPATIENT
Start: 2023-05-07

## 2023-05-07 RX ADMIN — ACETAMINOPHEN 975 MG: 325 TABLET ORAL at 12:33

## 2023-05-07 RX ADMIN — SENNOSIDES AND DOCUSATE SODIUM 1 TABLET: 50; 8.6 TABLET ORAL at 08:12

## 2023-05-07 RX ADMIN — IBUPROFEN 800 MG: 800 TABLET ORAL at 06:59

## 2023-05-07 RX ADMIN — ACETAMINOPHEN 975 MG: 325 TABLET ORAL at 01:34

## 2023-05-07 RX ADMIN — ENOXAPARIN SODIUM 40 MG: 100 INJECTION SUBCUTANEOUS at 08:12

## 2023-05-07 RX ADMIN — IBUPROFEN 800 MG: 800 TABLET ORAL at 01:34

## 2023-05-07 RX ADMIN — IBUPROFEN 800 MG: 800 TABLET ORAL at 12:33

## 2023-05-07 RX ADMIN — ACETAMINOPHEN 975 MG: 325 TABLET ORAL at 06:59

## 2023-05-07 ASSESSMENT — ACTIVITIES OF DAILY LIVING (ADL)
ADLS_ACUITY_SCORE: 20

## 2023-05-07 NOTE — DISCHARGE SUMMARY
"HOSPITAL DISCHARGE SUMMARY -  Birth    Patient Name: Donya Bliss   YOB: 1990  Age: 32 year old  Medical Record Number: [id number]  Admission Date: 2023  Discharge Date: 2023    Donya Bliss will be discharged from [facility] to home. She feels well and has no emotional concerns. Pain is well controlled with current medications. The baby is doing well.     /71 (BP Location: Left arm, Patient Position: Semi-Berger's, Cuff Size: Adult Regular)   Pulse 76   Temp 98.3  F (36.8  C) (Oral)   Resp 17   Ht 1.702 m (5' 7\")   Wt 94.3 kg (208 lb)   SpO2 97%   Breastfeeding Unknown   BMI 32.58 kg/m      REASON FOR ADMISSION: Labor and Delivery    MEDS: see  Med rec    PROCEDURES:  Lower transverse     HISTORY OF PRESENT ILLNESS AND HOSPITAL COURSE: This is a 32 year old female who underwent repeat  section and bilateral salpingectomy without complication. Postoperative course notable for ironr deficiency anemia. She was instructed to avoid any heavy lifting and to call if she had any difficulties.     DISCHARGE INSTRUCTION: Discharge to home  with instructions to follow up in the office in 1 weeks for a BP check.     Vivi Galvin MD     "

## 2023-05-07 NOTE — PLAN OF CARE
Goal Outcome Evaluation:      Plan of Care Reviewed With: patient    Overall Patient Progress: improvingOverall Patient Progress: improving         All discharge information reviewed with patient. Denies questions at this time. Understands when and where to follow up after discharged.

## 2023-05-07 NOTE — PROGRESS NOTES
PROVIDER NOTIFICATION:      Bps reviewed with Dr. Galvin- boarderline 137-138 systolic. Plan is to send patient home and have patient follow up in clinic this week.

## 2023-05-07 NOTE — PLAN OF CARE
Problem: Plan of Care - These are the overarching goals to be used throughout the patient stay.    Goal: Optimal Comfort and Wellbeing  Outcome: Progressing  Intervention: Provide Person-Centered Care  Recent Flowsheet Documentation  Taken 5/7/2023 0132 by Jacklyn Morrow RN  Trust Relationship/Rapport: care explained     Problem: Plan of Care - These are the overarching goals to be used throughout the patient stay.    Goal: Readiness for Transition of Care  Outcome: Progressing     Goal Outcome Evaluation:    Patients VSS. Pain controlled with scheduled tylenol and ibuprofen. Fundus is firm and midline. Bleeding WDL. Incision open to air. Ambulating independently. Denies headache, dizziness or nausea. Voiding spontaneously. Responsive to all babies cues.

## 2023-05-09 LAB
PATH REPORT.COMMENTS IMP SPEC: NORMAL
PATH REPORT.FINAL DX SPEC: NORMAL
PATH REPORT.FINAL DX SPEC: NORMAL
PATH REPORT.GROSS SPEC: NORMAL
PATH REPORT.GROSS SPEC: NORMAL
PATH REPORT.MICROSCOPIC SPEC OTHER STN: NORMAL
PATH REPORT.MICROSCOPIC SPEC OTHER STN: NORMAL
PATH REPORT.RELEVANT HX SPEC: NORMAL
PATH REPORT.RELEVANT HX SPEC: NORMAL
PHOTO IMAGE: NORMAL
PHOTO IMAGE: NORMAL

## 2023-05-12 ENCOUNTER — LAB REQUISITION (OUTPATIENT)
Dept: LAB | Facility: CLINIC | Age: 33
End: 2023-05-12
Payer: COMMERCIAL

## 2023-05-12 LAB
ALBUMIN MFR UR ELPH: 9.7 MG/DL (ref 1–14)
ALT SERPL W P-5'-P-CCNC: 20 U/L (ref 0–45)
AST SERPL W P-5'-P-CCNC: 15 U/L (ref 0–40)
CREAT SERPL-MCNC: 0.81 MG/DL (ref 0.6–1.1)
CREAT UR-MCNC: 93 MG/DL
ERYTHROCYTE [DISTWIDTH] IN BLOOD BY AUTOMATED COUNT: 12.4 % (ref 10–15)
GFR SERPL CREATININE-BSD FRML MDRD: >90 ML/MIN/1.73M2
HCT VFR BLD AUTO: 34.8 % (ref 35–47)
HGB BLD-MCNC: 11.6 G/DL (ref 11.7–15.7)
MCH RBC QN AUTO: 30.1 PG (ref 26.5–33)
MCHC RBC AUTO-ENTMCNC: 33.3 G/DL (ref 31.5–36.5)
MCV RBC AUTO: 90 FL (ref 78–100)
PLATELET # BLD AUTO: 280 10E3/UL (ref 150–450)
PROT/CREAT 24H UR: 0.1 MG/MG CR
RBC # BLD AUTO: 3.85 10E6/UL (ref 3.8–5.2)
URATE SERPL-MCNC: 6.7 MG/DL (ref 2–7.5)
WBC # BLD AUTO: 8.8 10E3/UL (ref 4–11)

## 2023-05-12 PROCEDURE — 84450 TRANSFERASE (AST) (SGOT): CPT | Mod: ORL | Performed by: OBSTETRICS & GYNECOLOGY

## 2023-05-12 PROCEDURE — 84550 ASSAY OF BLOOD/URIC ACID: CPT | Mod: ORL | Performed by: OBSTETRICS & GYNECOLOGY

## 2023-05-12 PROCEDURE — 85027 COMPLETE CBC AUTOMATED: CPT | Mod: ORL | Performed by: OBSTETRICS & GYNECOLOGY

## 2023-05-12 PROCEDURE — 84156 ASSAY OF PROTEIN URINE: CPT | Mod: ORL | Performed by: OBSTETRICS & GYNECOLOGY

## 2023-05-12 PROCEDURE — 84460 ALANINE AMINO (ALT) (SGPT): CPT | Mod: ORL | Performed by: OBSTETRICS & GYNECOLOGY

## 2023-05-12 PROCEDURE — 82565 ASSAY OF CREATININE: CPT | Mod: ORL | Performed by: OBSTETRICS & GYNECOLOGY

## 2023-05-12 PROCEDURE — 36415 COLL VENOUS BLD VENIPUNCTURE: CPT | Mod: ORL | Performed by: OBSTETRICS & GYNECOLOGY

## 2023-07-18 ENCOUNTER — TELEPHONE (OUTPATIENT)
Dept: SURGERY | Facility: CLINIC | Age: 33
End: 2023-07-18
Payer: COMMERCIAL

## 2023-07-18 NOTE — TELEPHONE ENCOUNTER
Patient called in and requested to cancel surgery on 8/3.  Reason: changing jobs, will not have insurance until Sept.    Declined to r/s at this time will call back to r/s when ready.    [facility] notified.

## 2023-08-12 ENCOUNTER — HEALTH MAINTENANCE LETTER (OUTPATIENT)
Age: 33
End: 2023-08-12

## 2024-09-29 ENCOUNTER — HEALTH MAINTENANCE LETTER (OUTPATIENT)
Age: 34
End: 2024-09-29

## (undated) DEVICE — PACK MAJOR BASIN 673

## (undated) DEVICE — SOL WATER IRRIG 1000ML BOTTLE 2F7114

## (undated) DEVICE — DRESSING MEPILEX BORDER POST-OP 4X12

## (undated) DEVICE — CUSTOM PACK C-SECTION LHE

## (undated) DEVICE — UNDERPAD 30X30 BULK 949B10

## (undated) DEVICE — PREP CHLORAPREP 26ML TINTED HI-LITE ORANGE 930815

## (undated) DEVICE — SUCTION MANIFOLD NEPTUNE 2 SYS 1 PORT 702-025-000

## (undated) DEVICE — PLATE GROUNDING ADULT W/CORD 9165L

## (undated) DEVICE — SUTURE VICRYL+ 2-0 27IN CT-1 UND VCP259H

## (undated) DEVICE — DRSG STERI STRIP 1/2X4" R1547

## (undated) DEVICE — SUTURE VICRYL+ 0 36IN CT-1 UND VCP946H

## (undated) DEVICE — SU VICRYL+ 3-0 KS UNDYED VCP663H

## (undated) DEVICE — GLOVE UNDER INDICATOR PI SZ 7.0 LF 41670

## (undated) DEVICE — SUCTION KIWI VAC PRO CUP DELIVERY SYSTEM VAC-6000S

## (undated) DEVICE — DRESSING MEPILEX AG SILVER 4X8 395890

## (undated) DEVICE — SUTURE MONOCRYL+ 0 CT-1 UND 18 MCP946H

## (undated) DEVICE — GOWN IMPERVIOUS BREATHABLE SMART LG 89015

## (undated) DEVICE — PAD INSERT MED PEACH 14X6.5 62550

## (undated) DEVICE — SOL NACL 0.9% IRRIG 1000ML BOTTLE 2F7124

## (undated) DEVICE — ESU LIGASURE OPEN SEALER/DIVIDER SM JAW 16.5MM LF1212A

## (undated) DEVICE — SURGICEL POWDER ABSORBABLE HEMOSTAT 3GM 3013SP

## (undated) DEVICE — SUCTION TIP YANKAUER W/O VENT K86

## (undated) DEVICE — ADH LIQUID MASTISOL TOPICAL VIAL 2-3ML 0523-48

## (undated) DEVICE — ESU LIGASURE TRIVERSE 3MM FT3000

## (undated) DEVICE — GLOVE SURG PI ULTRA TOUCH M SZ 6-1/2 LF

## (undated) DEVICE — PACK MINOR SINGLE BASIN SSK3001

## (undated) DEVICE — LINER PRINTED RED HAZARD 24X24 A4824PRRO

## (undated) RX ORDER — FENTANYL CITRATE-0.9 % NACL/PF 10 MCG/ML
PLASTIC BAG, INJECTION (ML) INTRAVENOUS
Status: DISPENSED
Start: 2023-05-05

## (undated) RX ORDER — BUPIVACAINE HYDROCHLORIDE 2.5 MG/ML
INJECTION, SOLUTION EPIDURAL; INFILTRATION; INTRACAUDAL
Status: DISPENSED
Start: 2023-05-05

## (undated) RX ORDER — FENTANYL CITRATE 50 UG/ML
INJECTION, SOLUTION INTRAMUSCULAR; INTRAVENOUS
Status: DISPENSED
Start: 2023-05-05

## (undated) RX ORDER — MORPHINE SULFATE 1 MG/ML
INJECTION, SOLUTION EPIDURAL; INTRATHECAL; INTRAVENOUS
Status: DISPENSED
Start: 2023-05-05

## (undated) RX ORDER — OXYTOCIN/0.9 % SODIUM CHLORIDE 30/500 ML
PLASTIC BAG, INJECTION (ML) INTRAVENOUS
Status: DISPENSED
Start: 2023-05-05